# Patient Record
Sex: MALE
[De-identification: names, ages, dates, MRNs, and addresses within clinical notes are randomized per-mention and may not be internally consistent; named-entity substitution may affect disease eponyms.]

---

## 2024-08-22 PROBLEM — Z00.00 ENCOUNTER FOR PREVENTIVE HEALTH EXAMINATION: Status: ACTIVE | Noted: 2024-08-22

## 2024-08-22 PROBLEM — J18.1 CONSOLIDATION OF RIGHT LOWER LOBE OF LUNG: Status: ACTIVE | Noted: 2024-08-22

## 2024-08-26 PROBLEM — Z87.891 FORMER SMOKER: Status: ACTIVE | Noted: 2024-08-26

## 2024-08-26 PROBLEM — Z86.39 HISTORY OF HYPERLIPIDEMIA: Status: RESOLVED | Noted: 2024-08-26 | Resolved: 2024-08-26

## 2024-08-26 PROBLEM — Z80.3 FAMILY HISTORY OF MALIGNANT NEOPLASM OF BREAST: Status: ACTIVE | Noted: 2024-08-26

## 2024-08-26 PROBLEM — Z87.01 HISTORY OF PNEUMONIA: Status: RESOLVED | Noted: 2024-08-26 | Resolved: 2024-08-26

## 2024-08-26 PROBLEM — Z82.5 FAMILY HISTORY OF ASTHMA: Status: ACTIVE | Noted: 2024-08-26

## 2024-08-26 PROBLEM — Z87.09 HISTORY OF CHRONIC OBSTRUCTIVE LUNG DISEASE: Status: RESOLVED | Noted: 2024-08-26 | Resolved: 2024-08-26

## 2024-08-26 PROBLEM — Z86.79 HISTORY OF HYPERTENSION: Status: RESOLVED | Noted: 2024-08-26 | Resolved: 2024-08-26

## 2024-08-28 ENCOUNTER — APPOINTMENT (OUTPATIENT)
Dept: THORACIC SURGERY | Facility: CLINIC | Age: 79
End: 2024-08-28
Payer: MEDICARE

## 2024-08-28 DIAGNOSIS — Z80.3 FAMILY HISTORY OF MALIGNANT NEOPLASM OF BREAST: ICD-10-CM

## 2024-08-28 DIAGNOSIS — Z86.39 PERSONAL HISTORY OF OTHER ENDOCRINE, NUTRITIONAL AND METABOLIC DISEASE: ICD-10-CM

## 2024-08-28 DIAGNOSIS — Z87.891 PERSONAL HISTORY OF NICOTINE DEPENDENCE: ICD-10-CM

## 2024-08-28 DIAGNOSIS — Z86.79 PERSONAL HISTORY OF OTHER DISEASES OF THE CIRCULATORY SYSTEM: ICD-10-CM

## 2024-08-28 DIAGNOSIS — Q33.2 SEQUESTRATION OF LUNG: ICD-10-CM

## 2024-08-28 DIAGNOSIS — J18.1 LOBAR PNEUMONIA, UNSPECIFIED ORGANISM: ICD-10-CM

## 2024-08-28 DIAGNOSIS — Z87.01 PERSONAL HISTORY OF PNEUMONIA (RECURRENT): ICD-10-CM

## 2024-08-28 DIAGNOSIS — Z82.5 FAMILY HISTORY OF ASTHMA AND OTHER CHRONIC LOWER RESPIRATORY DISEASES: ICD-10-CM

## 2024-08-28 DIAGNOSIS — Z87.09 PERSONAL HISTORY OF OTHER DISEASES OF THE RESPIRATORY SYSTEM: ICD-10-CM

## 2024-08-28 PROCEDURE — 99205 OFFICE O/P NEW HI 60 MIN: CPT

## 2024-08-28 RX ORDER — DOFETILIDE 0.5 MG/1
500 CAPSULE ORAL TWICE DAILY
Refills: 0 | Status: ACTIVE | COMMUNITY

## 2024-08-28 RX ORDER — MV-MIN/FOLIC/K1/LYCOPEN/LUTEIN 300-60 MCG
TABLET ORAL
Refills: 0 | Status: ACTIVE | COMMUNITY

## 2024-08-28 RX ORDER — FLUTICASONE PROPIONATE 50 UG/1
50 SPRAY, METERED NASAL TWICE DAILY
Refills: 0 | Status: ACTIVE | COMMUNITY

## 2024-08-28 RX ORDER — OMEPRAZOLE 20 MG/1
20 TABLET, DELAYED RELEASE ORAL
Refills: 0 | Status: ACTIVE | COMMUNITY

## 2024-08-28 RX ORDER — ATORVASTATIN CALCIUM 10 MG/1
10 TABLET, FILM COATED ORAL DAILY
Refills: 0 | Status: ACTIVE | COMMUNITY

## 2024-08-28 RX ORDER — IPRATROPIUM BROMIDE 21 UG/1
0.03 SPRAY NASAL TWICE DAILY
Refills: 0 | Status: ACTIVE | COMMUNITY

## 2024-08-28 RX ORDER — AZELASTINE HYDROCHLORIDE 137 UG/1
0.1 SPRAY, METERED NASAL TWICE DAILY
Refills: 0 | Status: ACTIVE | COMMUNITY

## 2024-08-28 RX ORDER — APIXABAN 5 MG/1
5 TABLET, FILM COATED ORAL
Refills: 0 | Status: ACTIVE | COMMUNITY

## 2024-08-28 RX ORDER — FLUTICASONE FUROATE, UMECLIDINIUM BROMIDE AND VILANTEROL TRIFENATATE 200; 62.5; 25 UG/1; UG/1; UG/1
200-62.5-25 POWDER RESPIRATORY (INHALATION)
Refills: 0 | Status: ACTIVE | COMMUNITY

## 2024-08-28 RX ORDER — MONTELUKAST 10 MG/1
10 TABLET, FILM COATED ORAL DAILY
Refills: 0 | Status: ACTIVE | COMMUNITY

## 2024-08-28 RX ORDER — ALBUTEROL SULFATE 90 UG/1
108 (90 BASE) INHALANT RESPIRATORY (INHALATION)
Refills: 0 | Status: ACTIVE | COMMUNITY

## 2024-08-28 RX ORDER — TAMSULOSIN HYDROCHLORIDE 0.4 MG/1
0.4 CAPSULE ORAL
Refills: 0 | Status: ACTIVE | COMMUNITY

## 2024-08-28 RX ORDER — DIGOXIN 125 UG/1
125 TABLET ORAL DAILY
Refills: 0 | Status: ACTIVE | COMMUNITY

## 2024-08-28 RX ORDER — CETIRIZINE HCL 10 MG
10 TABLET ORAL
Refills: 0 | Status: ACTIVE | COMMUNITY

## 2024-08-28 NOTE — CONSULT LETTER
[Dear  ___] : Dear  [unfilled], [Consult Letter:] : I had the pleasure of evaluating your patient, [unfilled]. [( Thank you for referring [unfilled] for consultation for _____ )] : Thank you for referring [unfilled] for consultation for [unfilled] [Please see my note below.] : Please see my note below. [Consult Closing:] : Thank you very much for allowing me to participate in the care of this patient.  If you have any questions, please do not hesitate to contact me. [Sincerely,] : Sincerely, [FreeTextEntry2] : Dr. Rashaun Kim (pulm/ref) [FreeTextEntry3] : Toño Sanchez MD, FACS , Division of Thoracic Surgery [facility] Thoracic Surgery [facility] Department of Cardiovascular & Thoracic Surgery  [employer] at [facility]

## 2024-08-28 NOTE — HISTORY OF PRESENT ILLNESS
[FreeTextEntry1] : Mr. BENJAMIN POOLE, 79 year old male, former smoker (30 pack yrs, quit 1996), w/ hx of COPD, AFib (on Eliquis, Tikosyn, and Diltiazem), BPH, GERD, HLD, HTN, bronchiectasis, and recurrence pneumonia x 1 month with suspicion of pulmonary sequestration.   CXR on 04/16/2020 ([facility]): wheeze, cough - There is a moderate right lower lobe pneumonia. A mild pneumonia is noted in the left lung base.  CXR on 06/12/2024 ([facility]): Subacute cough, wheezing, and SOB - Recurrent moderate posterior basal segment right lower lobe pneumonia.   CT Chest on 07/23/2024 ([facility]): - Moderate emphysema.  - There is a complex collection in the RLL, containing soft tissue density material mixed with air, fluid, and calcifications, the dominant component of which measures 8.9 x 5.9 x 5.5 cm. The calcifications and presence on chest x-ray since at least 4/16/2020, indicate chronicity.  - There are pleural-based calcifications in the RLL adjacent to the chronic consolidation. - 2.9 cm hepatic hypodensity/cyst.  CTA Chest on w/IV Contrast on 08/05/2024 ([facility]): - Secretions are present in the right mainstem bronchus, bronchus intermedius, and proximal RLL bronchi. - Stable emphysema. - Again seen is a chronic RLL consolidation containing air and fluid pockets as well as coarse calcifications, consistent with a sequestration. - There are calcifications along the adjacent pleura in the RLL. - The main pulmonary artery is dilated to 4.1 cm, suggesting pulmonary hypertension.   Patient is here today for CT Sx consultation, referred by Dr. Rashaun Kim (pulm) for possible surgical resection of RLL consolidation. Reports chronic productive cough with intermittent shortness of breath.

## 2024-08-28 NOTE — CONSULT LETTER
[Dear  ___] : Dear  [unfilled], [Consult Letter:] : I had the pleasure of evaluating your patient, [unfilled]. [( Thank you for referring [unfilled] for consultation for _____ )] : Thank you for referring [unfilled] for consultation for [unfilled] [Please see my note below.] : Please see my note below. [Consult Closing:] : Thank you very much for allowing me to participate in the care of this patient.  If you have any questions, please do not hesitate to contact me. [Sincerely,] : Sincerely, [FreeTextEntry2] : Dr. Rashaun Kim (pulm/ref) [FreeTextEntry3] : Toño Sanchez MD, FACS , Division of Thoracic Surgery [facility] Thoracic Surgery [facility] Department of Cardiovascular & Thoracic Surgery  [employer] at [facility]

## 2024-08-28 NOTE — HISTORY OF PRESENT ILLNESS
[FreeTextEntry1] : Mr. BENJAMIN POOLE, 79 year old male, former smoker (30 pack yrs, quit 1996), w/ hx of COPD, AFib (on Eliquis, Tikosyn, and Diltiazem), BPH, GERD, HLD, HTN, bronchiectasis, and recurrence pneumonia x 1 month with suspicion of pulmonary sequestration.   CXR on 04/16/2020 ([facility]): wheeze, cough - There is a moderate right lower lobe pneumonia. A mild pneumonia is noted in the left lung base.  CXR on 06/12/2024 ([facility]): Subacute cough, wheezing, and SOB - Recurrent moderate posterior basal segment right lower lobe pneumonia.   CT Chest on 07/23/2024 ([facility]): - Moderate emphysema.  - There is a complex collection in the RLL, containing soft tissue density material mixed with air, fluid, and calcifications, the dominant component of which measures 8.9 x 5.9 x 5.5 cm. The calcifications and presence on chest x-ray since at least 4/16/2020, indicate chronicity.  - There are pleural-based calcifications in the RLL adjacent to the chronic consolidation. - 2.9 cm hepatic hypodensity/cyst.  CTA Chest on w/IV Contrast on 08/05/2024 ([facility]): - Secretions are present in the right mainstem bronchus, bronchus intermedius, and proximal RLL bronchi. - Stable emphysema. - Again seen is a chronic RLL consolidation containing air and fluid pockets as well as coarse calcifications, consistent with a sequestration. - There are calcifications along the adjacent pleura in the RLL. - The main pulmonary artery is dilated to 4.1 cm, suggesting pulmonary hypertension.   Patient is here today for CT Sx consultation, referred by Dr. Rashaun Kim (pulm) for possible surgical resection of RLL consolidation. Reports chronic productive cough with intermittent shortness of breath.

## 2024-08-28 NOTE — HISTORY OF PRESENT ILLNESS
[FreeTextEntry1] : Mr. BENJAMIN POOLE, 79 year old male, former smoker (30 pack yrs, quit 1996), w/ hx of COPD, AFib (on Eliquis, Tikosyn, and Diltiazem), BPH, GERD, HLD, HTN, bronchiectasis, and recurrence pneumonia x 1 month with suspicion of pulmonary sequestration.   CXR on 04/16/2020 ([facility]): wheeze, cough - There is a moderate right lower lobe pneumonia. A mild pneumonia is noted in the left lung base.  CXR on 06/12/2024 ([facility]): Subacute cough, wheezing, and SOB - Recurrent moderate posterior basal segment right lower lobe pneumonia.   CT Chest on 07/23/2024 ([facility]): - Moderate emphysema.  - There is a complex collection in the RLL, containing soft tissue density material mixed with air, fluid, and calcifications, the dominant component of which measures 8.9 x 5.9 x 5.5 cm. The calcifications and presence on chest x-ray since at least 4/16/2020, indicate chronicity.  - There are pleural-based calcifications in the RLL adjacent to the chronic consolidation. - 2.9 cm hepatic hypodensity/cyst.  CTA Chest on w/IV Contrast on 08/05/2024 ([facility]): - Secretions are present in the right mainstem bronchus, bronchus intermedius, and proximal RLL bronchi. - Stable emphysema. - Again seen is a chronic RLL consolidation containing air and fluid pockets as well as coarse calcifications, consistent with a sequestration. - There are calcifications along the adjacent pleura in the RLL. - The main pulmonary artery is dilated to 4.1 cm, suggesting pulmonary hypertension.   Patient is here today for CT Sx consultation, referred by Dr. Rashaun Kim (pulm) for possible surgical resection of RLL consolidation. Reports chronic productive cough with intermittent shortness of breath.

## 2024-08-28 NOTE — HISTORY OF PRESENT ILLNESS
[FreeTextEntry1] : Mr. BENJAMIN POOLE, 79 year old male, former smoker (30 pack yrs, quit 1996), w/ hx of COPD, AFib (on Eliquis, Tikosyn, and Diltiazem), BPH, GERD, HLD, HTN, bronchiectasis, and recurrence pneumonia x 1 month with suspicion of pulmonary sequestration.   CXR on 04/16/2020 ([facility]): wheeze, cough - There is a moderate right lower lobe pneumonia. A mild pneumonia is noted in the left lung base.  CXR on 06/12/2024 ([facility]): Subacute cough, wheezing, and SOB - Recurrent moderate posterior basal segment right lower lobe pneumonia.   CT Chest on 07/23/2024 ([facility]): - Moderate emphysema.  - There is a complex collection in the RLL, containing soft tissue density material mixed with air, fluid, and calcifications, the dominant component of which measures 8.9 x 5.9 x 5.5 cm. The calcifications and presence on chest x-ray since at least 4/16/2020, indicate chronicity.  - There are pleural-based calcifications in the RLL adjacent to the chronic consolidation. - 2.9 cm hepatic hypodensity/cyst.  CTA Chest on w/IV Contrast on 08/05/2024 ([facility]): - Secretions are present in the right mainstem bronchus, bronchus intermedius, and proximal RLL bronchi. - Stable emphysema. - Again seen is a chronic RLL consolidation containing air and fluid pockets as well as coarse calcifications, consistent with a sequestration. - There are calcifications along the adjacent pleura in the RLL. - The main pulmonary artery is dilated to 4.1 cm, suggesting pulmonary hypertension.   Patient is here today for CT Sx consultation, referred by Dr. Rashaun Kim (pulm) for possible surgical resection of RLL consolidation. Reports chronic productive cough with intermittent shortness of breath.

## 2024-08-28 NOTE — PHYSICAL EXAM
[Fully active, able to carry on all pre-disease performance without restriction] : Status 0 - Fully active, able to carry on all pre-disease performance without restriction [General Appearance - Alert] : alert [General Appearance - In No Acute Distress] : in no acute distress [Sclera] : the sclera and conjunctiva were normal [PERRL With Normal Accommodation] : pupils were equal in size, round, and reactive to light [Extraocular Movements] : extraocular movements were intact [Outer Ear] : the ears and nose were normal in appearance [Oropharynx] : the oropharynx was normal [Neck Appearance] : the appearance of the neck was normal [Neck Cervical Mass (___cm)] : no neck mass was observed [Thyroid Diffuse Enlargement] : the thyroid was not enlarged [Jugular Venous Distention Increased] : there was no jugular-venous distention [Thyroid Nodule] : there were no palpable thyroid nodules [Auscultation Breath Sounds / Voice Sounds] : lungs were clear to auscultation bilaterally [Heart Rate And Rhythm] : heart rate was normal and rhythm regular [Heart Sounds] : normal S1 and S2 [Heart Sounds Gallop] : no gallops [Murmurs] : no murmurs [Heart Sounds Pericardial Friction Rub] : no pericardial rub [Examination Of The Chest] : the chest was normal in appearance [Chest Visual Inspection Thoracic Asymmetry] : no chest asymmetry [Diminished Respiratory Excursion] : normal chest expansion [2+] : left 2+ [No Abnormalities] : the abdominal aorta was not enlarged and no bruit was heard [Breast Appearance] : normal in appearance [Breast Palpation Mass] : no palpable masses [Bowel Sounds] : normal bowel sounds [Abdomen Soft] : soft [Abdomen Tenderness] : non-tender [Abdomen Mass (___ Cm)] : no abdominal mass palpated [Cervical Lymph Nodes Enlarged Posterior Bilaterally] : posterior cervical [Cervical Lymph Nodes Enlarged Anterior Bilaterally] : anterior cervical [Supraclavicular Lymph Nodes Enlarged Bilaterally] : supraclavicular [Axillary Lymph Nodes Enlarged Bilaterally] : axillary [Femoral Lymph Nodes Enlarged Bilaterally] : femoral [Inguinal Lymph Nodes Enlarged Bilaterally] : inguinal [No CVA Tenderness] : no ~M costovertebral angle tenderness [No Spinal Tenderness] : no spinal tenderness [Abnormal Walk] : normal gait [Nail Clubbing] : no clubbing  or cyanosis of the fingernails [Musculoskeletal - Swelling] : no joint swelling seen [Motor Tone] : muscle strength and tone were normal [Skin Color & Pigmentation] : normal skin color and pigmentation [Skin Turgor] : normal skin turgor [] : no rash [Deep Tendon Reflexes (DTR)] : deep tendon reflexes were 2+ and symmetric [Sensation] : the sensory exam was normal to light touch and pinprick [No Focal Deficits] : no focal deficits [Oriented To Time, Place, And Person] : oriented to person, place, and time [Impaired Insight] : insight and judgment were intact [Affect] : the affect was normal [Right Carotid Bruit] : no bruit heard over the right carotid [Left Carotid Bruit] : no bruit heard over the left carotid [Right Femoral Bruit] : no bruit heard over the right femoral artery [Left Femoral Bruit] : no bruit heard over the left femoral artery [FreeTextEntry1] : deferred

## 2024-08-28 NOTE — ASSESSMENT
[FreeTextEntry1] : Mr. BENJAMIN POOLE, 79 year old male, former smoker (30 pack yrs, quit 1996), w/ hx of COPD, AFib (on Eliquis, Tikosyn, and Diltiazem), BPH, GERD, HLD, HTN, bronchiectasis, and recurrence pneumonia x 1 month with suspicion of pulmonary sequestration.  Patient is here today for CT Sx consultation, referred by Dr. Rashaun Kim (pulm).  I have independently reviewed the medical records and imaging at the time of this office consultation. Imaging reviewed with patient, findings consistent with pulmonary sequestration and RLL consolidation from recurrence pneumonia over the years. Surgical approach reviewed with patient. Discussed risks in details, patient is agreeable to proceed. Will schedule for Right thoracotomy, RLLobectomy with epidural and cryoablation of intercoastal space. He will need cardiac clearance with ECHO. Will need to hold Eliquis for 3 days prior to surgery. Will obtain PFTs from Dr. Kim.   Recommendations reviewed with patient during this office visit, and all questions answered; Patient instructed on the importance of follow up and verbalizes understanding.    I, DANIELA Swain, personally performed the evaluation and management (E/M) services for this new patient.  That E/M includes conducting the initial examination, assessing all conditions, and establishing the plan of care.  Today, my ACP, Jason Hand NP, was here to observe my evaluation and management services for this patient to be followed going forward.

## 2024-08-28 NOTE — CONSULT LETTER
[Dear  ___] : Dear  [unfilled], [Consult Letter:] : I had the pleasure of evaluating your patient, [unfilled]. [( Thank you for referring [unfilled] for consultation for _____ )] : Thank you for referring [unfilled] for consultation for [unfilled] [Please see my note below.] : Please see my note below. [Consult Closing:] : Thank you very much for allowing me to participate in the care of this patient.  If you have any questions, please do not hesitate to contact me. [Sincerely,] : Sincerely, [FreeTextEntry2] : Dr. Rashaun Kim (pulm/ref) [FreeTextEntry3] : Toño Sanchez MD, FACS , Division of Thoracic Surgery [facility] Thoracic Surgery [facility] Department of Cardiovascular & Thoracic Surgery  [employer] at [facility]

## 2024-08-28 NOTE — DATA REVIEWED
WDL
[FreeTextEntry1] : I have independently reviewed the following: CXR on 04/16/2020 CXR on 06/12/2024 CT Chest on 07/23/2024 CTA Chest on w/IV Contrast on 08/05/2024 

## 2024-08-28 NOTE — CONSULT LETTER
[Dear  ___] : Dear  [unfilled], [Consult Letter:] : I had the pleasure of evaluating your patient, [unfilled]. [( Thank you for referring [unfilled] for consultation for _____ )] : Thank you for referring [unfilled] for consultation for [unfilled] [Please see my note below.] : Please see my note below. [Consult Closing:] : Thank you very much for allowing me to participate in the care of this patient.  If you have any questions, please do not hesitate to contact me. [Sincerely,] : Sincerely, [FreeTextEntry2] : Dr. Rashaun Kim (pulm/ref) [FreeTextEntry3] : Toño Sanchez MD, FACS , Division of Thoracic Surgery [facility] Thoracic Surgery [facility] Department of Cardiovascular & Thoracic Surgery  [employer] at [facility]

## 2024-10-15 ENCOUNTER — OUTPATIENT (OUTPATIENT)
Dept: OUTPATIENT SERVICES | Facility: HOSPITAL | Age: 79
LOS: 1 days | End: 2024-10-15

## 2024-10-15 VITALS
WEIGHT: 179.9 LBS | HEART RATE: 64 BPM | SYSTOLIC BLOOD PRESSURE: 145 MMHG | HEIGHT: 70 IN | OXYGEN SATURATION: 97 % | TEMPERATURE: 98 F | DIASTOLIC BLOOD PRESSURE: 62 MMHG | RESPIRATION RATE: 17 BRPM

## 2024-10-15 DIAGNOSIS — Q33.2 SEQUESTRATION OF LUNG: ICD-10-CM

## 2024-10-15 DIAGNOSIS — Z98.49 CATARACT EXTRACTION STATUS, UNSPECIFIED EYE: Chronic | ICD-10-CM

## 2024-10-15 DIAGNOSIS — I10 ESSENTIAL (PRIMARY) HYPERTENSION: ICD-10-CM

## 2024-10-15 DIAGNOSIS — Z90.89 ACQUIRED ABSENCE OF OTHER ORGANS: Chronic | ICD-10-CM

## 2024-10-15 DIAGNOSIS — I48.91 UNSPECIFIED ATRIAL FIBRILLATION: ICD-10-CM

## 2024-10-15 LAB
ANION GAP SERPL CALC-SCNC: 11 MMOL/L — SIGNIFICANT CHANGE UP (ref 7–14)
BLD GP AB SCN SERPL QL: NEGATIVE — SIGNIFICANT CHANGE UP
BUN SERPL-MCNC: 14 MG/DL — SIGNIFICANT CHANGE UP (ref 7–23)
CALCIUM SERPL-MCNC: 9.5 MG/DL — SIGNIFICANT CHANGE UP (ref 8.4–10.5)
CHLORIDE SERPL-SCNC: 101 MMOL/L — SIGNIFICANT CHANGE UP (ref 98–107)
CO2 SERPL-SCNC: 26 MMOL/L — SIGNIFICANT CHANGE UP (ref 22–31)
CREAT SERPL-MCNC: 0.86 MG/DL — SIGNIFICANT CHANGE UP (ref 0.5–1.3)
EGFR: 88 ML/MIN/1.73M2 — SIGNIFICANT CHANGE UP
GLUCOSE SERPL-MCNC: 91 MG/DL — SIGNIFICANT CHANGE UP (ref 70–99)
POTASSIUM SERPL-MCNC: 4 MMOL/L — SIGNIFICANT CHANGE UP (ref 3.5–5.3)
POTASSIUM SERPL-SCNC: 4 MMOL/L — SIGNIFICANT CHANGE UP (ref 3.5–5.3)
RH IG SCN BLD-IMP: POSITIVE — SIGNIFICANT CHANGE UP
RH IG SCN BLD-IMP: POSITIVE — SIGNIFICANT CHANGE UP
SODIUM SERPL-SCNC: 138 MMOL/L — SIGNIFICANT CHANGE UP (ref 135–145)

## 2024-10-15 NOTE — H&P PST ADULT - PROBLEM SELECTOR PLAN 3
Postoperative Delirium Screen    Patient eligible for azael risk screen age>75?  (if <= 75 then done) Yes    Health care proxy paperwork given to patient? pt. does not want HCP form    Impaired mobility (ie: uses cane, walker, wheelchair, or assist device)? No    Known dementia diagnosis? No    Impaired functional status (METS<4)? No    Malnutrition BMI<20? No

## 2024-10-15 NOTE — H&P PST ADULT - RESPIRATORY AND THORAX COMMENTS
[facility] x2-last ER visit  was because of pneumonia 8/2024, last hospitalization was 3/2021 for asthma and pneumonia, last rescue inhaler use "about 9 months ago" [facility] x2-last ER visit  was because of pneumonia 8/2024, last hospitalization was 3/2021 for asthma and pneumonia, last rescue inhaler use "about 9 months ago", last wheezing "was when I had pneumonia in August" (almost 2 months ago) pt. has emphysema, Was at [facility] x2-last ER visit  was because of pneumonia 8/2024, last hospitalization was 3/2021 for asthma and pneumonia, last rescue inhaler use "about 9 months ago", last wheezing "was when I had pneumonia in August" (almost 2 months ago)

## 2024-10-15 NOTE — H&P PST ADULT - PROBLEM SELECTOR PLAN 2
Established patient with new problem    Ariane is here today for complaints of left knee pain.  She had a fall while she was transferring from her chair with assistance from an aide.  She does not recall if she fell directly onto the knee or onto her side.  This occurred about 2 weeks ago.  She is nonambulatory, transferring between bed to chair with assistance.  The pain since the fall has been within her left knee.  It is painful with movement.  Treatment thus far has included Tylenol.  Prior history of a left femur fracture with intramedullary rodding April 29, 2020 by Dr. Carlos Alberto Priest.    PHYSICAL EXAMINATION:  In general, She is alert and oriented and in no acute distress. Ariane is well-groomed and cooperative with the exam. Affect and mood are appropriate. Skin is intact. No lesions noted.     Physical examination of the left knee reveals a mild-to-moderate effusion.  Tenderness to palpation to the medial and lateral joint line.  Range of motion is tolerated well with minimal complaints of pain from °.  Calf is soft and nontender.  Sensory and motor exams are intact. Distal pulses are palpable.    IMAGING:  X-rays taken today of the left knee show chondrocalcinosis of the medial lateral compartments.  Generalized arthritic changes throughout the knee.  Generalized osteopenia.  No evidence of fracture.  Interval healing of the distal femur fracture with noted intramedullary rodding.    IMPRESSION:  Left knee osteoarthritis, chondrocalcinosis    PLAN:  A cortisone injection is recommended to the left knee today.  She is agreeable to a cortisone injection today.  Continue with activities to tolerance, relies on wheelchair for mobility.   Follow-up as needed.      Under sterile chlorhexidine prep,  2 cc of 2% lidocaine and 40 mg of triamcinolone was injected within the left knee and tolerated well.       
Pt.. had cardiac evaluation.  Pt. states the Cardiologist did not give instructions on if/when to stop Eliquis.  Pt. instructed to take last dose of Eliquis 10/18/24 as per [facility] protocol.  Pt. instructed to confirm with his Cardiologist.

## 2024-10-15 NOTE — H&P PST ADULT - BSA (M2)
Refilled Omeprazole #90.   Discussion of long-term effects of chronic PPI use should be done with patient before continuing this further.   2

## 2024-10-15 NOTE — H&P PST ADULT - PROBLEM SELECTOR PLAN 1
Pt. is scheduled for a right thoracotomy, right lower lobe lobectomy with epidural and cryoablation of intercostal space 10/21/24.  Pt. verbalized understanding of instructions and that Chlorhexidine is for external use. PST CBC, BMP, T&S, and conf. T&S drawn.

## 2024-10-15 NOTE — H&P PST ADULT - RS GEN HX ROS MEA POS PC
"white gray", has had the cough "off and on for 40 years because it's tied to the bronchiectasis I have in the right lung"/cough/sputum production "white gray", has had the cough "off and on for 40 years because it's tied to the bronchiectasis I have in the right lung"/dyspnea/cough/sputum production

## 2024-10-15 NOTE — H&P PST ADULT - CARDIOVASCULAR COMMENTS
no YOUSIF while at [facility], pt. is on [employer] for agusto mayo has a. fib.

## 2024-10-15 NOTE — H&P PST ADULT - NSICDXFAMILYHX_GEN_ALL_CORE_FT
FAMILY HISTORY:  Father  Still living? No  FH: heart failure, Age at diagnosis: Age Unknown    Mother  Still living? No  FH: breast cancer, Age at diagnosis: Age Unknown    Aunt  Still living? No  FH: breast cancer, Age at diagnosis: Age Unknown

## 2024-10-15 NOTE — H&P PST ADULT - REASON FOR ADMISSION
"they're taking a piece ot the lung out, a lobectomy, it's a pulmonary sequestration, it's very rare, so it figures"

## 2024-10-15 NOTE — H&P PST ADULT - NSICDXPASTSURGICALHX_GEN_ALL_CORE_FT
PAST SURGICAL HISTORY:  H/O cataract extraction     History of appendectomy     S/P T&A (status post tonsillectomy and adenoidectomy)

## 2024-10-15 NOTE — H&P PST ADULT - NSICDXPASTMEDICALHX_GEN_ALL_CORE_FT
PAST MEDICAL HISTORY:  Atrial fibrillation     Bronchiectasis     COPD (chronic obstructive pulmonary disease)     Enlarged prostate     GERD (gastroesophageal reflux disease)     HTN (hypertension)     Left cataract     Recurrent pneumonia     Seasonal allergies      PAST MEDICAL HISTORY:  Atrial fibrillation     Bronchiectasis     COPD (chronic obstructive pulmonary disease)     Enlarged prostate     GERD (gastroesophageal reflux disease)     HTN (hypertension)     Left cataract     Pulmonary sequestration     Recurrent pneumonia     Seasonal allergies      PAST MEDICAL HISTORY:  Asthma     Atrial fibrillation     Bronchiectasis     Chronic cough     Emphysema/COPD     Enlarged prostate     GERD (gastroesophageal reflux disease)     HTN (hypertension)     Left cataract     Pulmonary sequestration     Recurrent pneumonia     Seasonal allergies      PAST MEDICAL HISTORY:  Asthma     Atrial fibrillation     Bronchiectasis     Chronic cough     Emphysema/COPD     Enlarged prostate     GERD (gastroesophageal reflux disease)     Hearing loss     HTN (hypertension)     Left cataract     Pulmonary sequestration     Recurrent pneumonia     Seasonal allergies

## 2024-10-15 NOTE — H&P PST ADULT - OTHER CARE PROVIDERS
Dr. Kim, Pulmonologist Dr. Kim, Pulmonologist [phone]; Dr. Christopher, Cardiologist [phone]; Dr. Zamora, Allergist [phone]

## 2024-10-15 NOTE — H&P PST ADULT - HISTORY OF PRESENT ILLNESS
Pt. is a 78 yo male with recurrent pneumonia.  Pt. was evaluated by his new Pulmonologist.  The findings were indicative of needing a CT scan.  The results were abnormal.   Pt. is a 80 yo male with recurrent pneumonia.  Last hospitalization was 8/2024.  Pt. was evaluated by his new Pulmonologist.  The findings were indicative of needing a CT scan.  The results showed lung sequestration.     Pt. is a 80 yo male with recurrent pneumonia.  Last hospitalization was 8/2024.  Pt. was evaluated by his new Pulmonologist.  The findings were indicative of needing a CT scan.  The results showed pulmonary sequestration.

## 2024-10-15 NOTE — H&P PST ADULT - RHONCHI
diffuse, had pt. cough, after cough it was less diffuse, asked pt. to cough, after cough it was less

## 2024-10-16 LAB
BASOPHILS # BLD AUTO: 0.04 K/UL — SIGNIFICANT CHANGE UP (ref 0–0.2)
BASOPHILS NFR BLD AUTO: 0.4 % — SIGNIFICANT CHANGE UP (ref 0–2)
EOSINOPHIL # BLD AUTO: 0.21 K/UL — SIGNIFICANT CHANGE UP (ref 0–0.5)
EOSINOPHIL NFR BLD AUTO: 1.9 % — SIGNIFICANT CHANGE UP (ref 0–6)
HCT VFR BLD CALC: 44.2 % — SIGNIFICANT CHANGE UP (ref 39–50)
HGB BLD-MCNC: 14.8 G/DL — SIGNIFICANT CHANGE UP (ref 13–17)
IMM GRANULOCYTES NFR BLD AUTO: 0.4 % — SIGNIFICANT CHANGE UP (ref 0–0.9)
LYMPHOCYTES # BLD AUTO: 18.5 % — SIGNIFICANT CHANGE UP (ref 13–44)
LYMPHOCYTES # BLD AUTO: 2.04 K/UL — SIGNIFICANT CHANGE UP (ref 1–3.3)
MCHC RBC-ENTMCNC: 31.2 PG — SIGNIFICANT CHANGE UP (ref 27–34)
MCHC RBC-ENTMCNC: 33.5 GM/DL — SIGNIFICANT CHANGE UP (ref 32–36)
MCV RBC AUTO: 93.2 FL — SIGNIFICANT CHANGE UP (ref 80–100)
MONOCYTES # BLD AUTO: 1.03 K/UL — HIGH (ref 0–0.9)
MONOCYTES NFR BLD AUTO: 9.3 % — SIGNIFICANT CHANGE UP (ref 2–14)
NEUTROPHILS # BLD AUTO: 7.66 K/UL — HIGH (ref 1.8–7.4)
NEUTROPHILS NFR BLD AUTO: 69.5 % — SIGNIFICANT CHANGE UP (ref 43–77)
PLATELET # BLD AUTO: 434 K/UL — HIGH (ref 150–400)
RBC # BLD: 4.74 M/UL — SIGNIFICANT CHANGE UP (ref 4.2–5.8)
RBC # FLD: 13.1 % — SIGNIFICANT CHANGE UP (ref 10.3–14.5)
WBC # BLD: 11.02 K/UL — HIGH (ref 3.8–10.5)
WBC # FLD AUTO: 11.02 K/UL — HIGH (ref 3.8–10.5)

## 2024-10-18 RX ORDER — ACETAMINOPHEN 500 MG
975 TABLET ORAL ONCE
Refills: 0 | Status: COMPLETED | OUTPATIENT
Start: 2024-10-21 | End: 2024-10-21

## 2024-10-18 NOTE — ASU PATIENT PROFILE, ADULT - ABILITY TO HEAR (WITH HEARING AID OR HEARING APPLIANCE IF NORMALLY USED):
Resighini/Mildly to Moderately Impaired: difficulty hearing in some environments or speaker may need to increase volume or speak distinctly

## 2024-10-18 NOTE — ASU PATIENT PROFILE, ADULT - NSICDXPASTMEDICALHX_GEN_ALL_CORE_FT
PAST MEDICAL HISTORY:  Asthma     Atrial fibrillation     Bronchiectasis     Chronic cough     Emphysema/COPD     Enlarged prostate     GERD (gastroesophageal reflux disease)     Hearing loss     HTN (hypertension)     Left cataract     Pulmonary sequestration     Recurrent pneumonia     Seasonal allergies

## 2024-10-21 ENCOUNTER — APPOINTMENT (OUTPATIENT)
Dept: THORACIC SURGERY | Facility: HOSPITAL | Age: 79
End: 2024-10-21
Payer: MEDICARE

## 2024-10-21 ENCOUNTER — INPATIENT (INPATIENT)
Facility: HOSPITAL | Age: 79
LOS: 2 days | Discharge: HOME | End: 2024-10-24
Attending: THORACIC SURGERY (CARDIOTHORACIC VASCULAR SURGERY) | Admitting: THORACIC SURGERY (CARDIOTHORACIC VASCULAR SURGERY)
Payer: MEDICARE

## 2024-10-21 ENCOUNTER — RESULT REVIEW (OUTPATIENT)
Age: 79
End: 2024-10-21

## 2024-10-21 ENCOUNTER — TRANSCRIPTION ENCOUNTER (OUTPATIENT)
Age: 79
End: 2024-10-21

## 2024-10-21 VITALS
SYSTOLIC BLOOD PRESSURE: 162 MMHG | DIASTOLIC BLOOD PRESSURE: 54 MMHG | OXYGEN SATURATION: 95 % | RESPIRATION RATE: 16 BRPM | TEMPERATURE: 98 F | HEART RATE: 78 BPM | WEIGHT: 179.9 LBS | HEIGHT: 70 IN

## 2024-10-21 DIAGNOSIS — Q33.2 SEQUESTRATION OF LUNG: ICD-10-CM

## 2024-10-21 DIAGNOSIS — Z90.89 ACQUIRED ABSENCE OF OTHER ORGANS: Chronic | ICD-10-CM

## 2024-10-21 DIAGNOSIS — Z98.49 CATARACT EXTRACTION STATUS, UNSPECIFIED EYE: Chronic | ICD-10-CM

## 2024-10-21 LAB
ANION GAP SERPL CALC-SCNC: 11 MMOL/L — SIGNIFICANT CHANGE UP (ref 7–14)
BUN SERPL-MCNC: 15 MG/DL — SIGNIFICANT CHANGE UP (ref 7–23)
CALCIUM SERPL-MCNC: 8.2 MG/DL — LOW (ref 8.4–10.5)
CHLORIDE SERPL-SCNC: 104 MMOL/L — SIGNIFICANT CHANGE UP (ref 98–107)
CO2 SERPL-SCNC: 22 MMOL/L — SIGNIFICANT CHANGE UP (ref 22–31)
CREAT SERPL-MCNC: 0.77 MG/DL — SIGNIFICANT CHANGE UP (ref 0.5–1.3)
EGFR: 91 ML/MIN/1.73M2 — SIGNIFICANT CHANGE UP
GLUCOSE SERPL-MCNC: 130 MG/DL — HIGH (ref 70–99)
HCT VFR BLD CALC: 40.5 % — SIGNIFICANT CHANGE UP (ref 39–50)
HGB BLD-MCNC: 13.7 G/DL — SIGNIFICANT CHANGE UP (ref 13–17)
MAGNESIUM SERPL-MCNC: 1.9 MG/DL — SIGNIFICANT CHANGE UP (ref 1.6–2.6)
MCHC RBC-ENTMCNC: 31.5 PG — SIGNIFICANT CHANGE UP (ref 27–34)
MCHC RBC-ENTMCNC: 33.8 GM/DL — SIGNIFICANT CHANGE UP (ref 32–36)
MCV RBC AUTO: 93.1 FL — SIGNIFICANT CHANGE UP (ref 80–100)
NRBC # BLD: 0 /100 WBCS — SIGNIFICANT CHANGE UP (ref 0–0)
NRBC # FLD: 0 K/UL — SIGNIFICANT CHANGE UP (ref 0–0)
PHOSPHATE SERPL-MCNC: 3.1 MG/DL — SIGNIFICANT CHANGE UP (ref 2.5–4.5)
PLATELET # BLD AUTO: 373 K/UL — SIGNIFICANT CHANGE UP (ref 150–400)
POTASSIUM SERPL-MCNC: 4.2 MMOL/L — SIGNIFICANT CHANGE UP (ref 3.5–5.3)
POTASSIUM SERPL-SCNC: 4.2 MMOL/L — SIGNIFICANT CHANGE UP (ref 3.5–5.3)
RBC # BLD: 4.35 M/UL — SIGNIFICANT CHANGE UP (ref 4.2–5.8)
RBC # FLD: 12.5 % — SIGNIFICANT CHANGE UP (ref 10.3–14.5)
SODIUM SERPL-SCNC: 137 MMOL/L — SIGNIFICANT CHANGE UP (ref 135–145)
WBC # BLD: 18.53 K/UL — HIGH (ref 3.8–10.5)
WBC # FLD AUTO: 18.53 K/UL — HIGH (ref 3.8–10.5)

## 2024-10-21 PROCEDURE — 88309 TISSUE EXAM BY PATHOLOGIST: CPT | Mod: 26

## 2024-10-21 PROCEDURE — 64620 INJECTION TREATMENT OF NERVE: CPT | Mod: 59

## 2024-10-21 PROCEDURE — 32480 PARTIAL REMOVAL OF LUNG: CPT | Mod: RT

## 2024-10-21 PROCEDURE — ZZZZZ: CPT

## 2024-10-21 PROCEDURE — 88305 TISSUE EXAM BY PATHOLOGIST: CPT | Mod: 26

## 2024-10-21 PROCEDURE — 99233 SBSQ HOSP IP/OBS HIGH 50: CPT

## 2024-10-21 PROCEDURE — 32124 EXPLORE CHEST FREE ADHESIONS: CPT | Mod: 80,RT,59

## 2024-10-21 PROCEDURE — 31646 BRNCHSC W/THER ASPIR SBSQ: CPT | Mod: 59

## 2024-10-21 PROCEDURE — 32124 EXPLORE CHEST FREE ADHESIONS: CPT | Mod: RT,59

## 2024-10-21 PROCEDURE — 31624 DX BRONCHOSCOPE/LAVAGE: CPT | Mod: 59

## 2024-10-21 PROCEDURE — 71045 X-RAY EXAM CHEST 1 VIEW: CPT | Mod: 26

## 2024-10-21 PROCEDURE — 31645 BRNCHSC W/THER ASPIR 1ST: CPT | Mod: 59

## 2024-10-21 PROCEDURE — 32480 PARTIAL REMOVAL OF LUNG: CPT | Mod: 80,RT

## 2024-10-21 DEVICE — STAPLER COVIDIEN TA 60 GREEN: Type: IMPLANTABLE DEVICE | Site: RIGHT | Status: FUNCTIONAL

## 2024-10-21 DEVICE — BONE WAX 2.5GM: Type: IMPLANTABLE DEVICE | Site: RIGHT | Status: FUNCTIONAL

## 2024-10-21 DEVICE — SURGICEL FIBRILLAR 2 X 4": Type: IMPLANTABLE DEVICE | Site: RIGHT | Status: FUNCTIONAL

## 2024-10-21 DEVICE — STAPLER COVIDIEN TA 60 GREEN RELOAD: Type: IMPLANTABLE DEVICE | Site: RIGHT | Status: FUNCTIONAL

## 2024-10-21 DEVICE — STAPLER ETHICON GST ECHELON 45MM GREEN RELOAD: Type: IMPLANTABLE DEVICE | Site: RIGHT | Status: FUNCTIONAL

## 2024-10-21 DEVICE — PROBE CRYOSPHERE CRYOICE PLUS CRYOABLATION 8MMX11IN: Type: IMPLANTABLE DEVICE | Site: RIGHT | Status: FUNCTIONAL

## 2024-10-21 DEVICE — STAPLER ETHICON ECHELON ENDOPATH VASCULAR 35MM WHITE RELOAD: Type: IMPLANTABLE DEVICE | Site: RIGHT | Status: FUNCTIONAL

## 2024-10-21 DEVICE — CHEST DRAIN THORACIC ARGYLE PVC 24FR STRAIGHT: Type: IMPLANTABLE DEVICE | Site: RIGHT | Status: FUNCTIONAL

## 2024-10-21 RX ORDER — CEFAZOLIN SODIUM 1 G
2000 VIAL (EA) INJECTION EVERY 8 HOURS
Refills: 0 | Status: DISCONTINUED | OUTPATIENT
Start: 2024-10-21 | End: 2024-10-22

## 2024-10-21 RX ORDER — CHLORHEXIDINE GLUCONATE 40 MG/ML
1 SOLUTION TOPICAL DAILY
Refills: 0 | Status: DISCONTINUED | OUTPATIENT
Start: 2024-10-21 | End: 2024-10-24

## 2024-10-21 RX ORDER — TIOTROPIUM BROMIDE INHALATION SPRAY 1.56 UG/1
2 SPRAY, METERED RESPIRATORY (INHALATION) DAILY
Refills: 0 | Status: DISCONTINUED | OUTPATIENT
Start: 2024-10-21 | End: 2024-10-24

## 2024-10-21 RX ORDER — HYDROMORPHONE HCL/0.9% NACL/PF 6 MG/30 ML
250 PATIENT CONTROLLED ANALGESIA SYRINGE INTRAVENOUS
Refills: 0 | Status: DISCONTINUED | OUTPATIENT
Start: 2024-10-21 | End: 2024-10-24

## 2024-10-21 RX ORDER — DOFETILIDE 0.25 MG/1
500 CAPSULE ORAL EVERY 12 HOURS
Refills: 0 | Status: DISCONTINUED | OUTPATIENT
Start: 2024-10-21 | End: 2024-10-21

## 2024-10-21 RX ORDER — DIPHENHYDRAMINE HCL 12.5MG/5ML
25 ELIXIR ORAL EVERY 4 HOURS
Refills: 0 | Status: DISCONTINUED | OUTPATIENT
Start: 2024-10-21 | End: 2024-10-24

## 2024-10-21 RX ORDER — DILTIAZEM HCL 5 MG/ML
360 VIAL (ML) INTRAVENOUS DAILY
Refills: 0 | Status: DISCONTINUED | OUTPATIENT
Start: 2024-10-21 | End: 2024-10-21

## 2024-10-21 RX ORDER — SENNA 187 MG
2 TABLET ORAL AT BEDTIME
Refills: 0 | Status: DISCONTINUED | OUTPATIENT
Start: 2024-10-21 | End: 2024-10-24

## 2024-10-21 RX ORDER — DIGOXIN 250 MCG
250 TABLET ORAL DAILY
Refills: 0 | Status: DISCONTINUED | OUTPATIENT
Start: 2024-10-21 | End: 2024-10-22

## 2024-10-21 RX ORDER — FLUTICASONE PROPIONATE 50 UG/1
2 SPRAY, METERED NASAL
Refills: 0 | Status: DISCONTINUED | OUTPATIENT
Start: 2024-10-21 | End: 2024-10-24

## 2024-10-21 RX ORDER — HEPARIN SODIUM 10000 [USP'U]/ML
5000 INJECTION INTRAVENOUS; SUBCUTANEOUS EVERY 8 HOURS
Refills: 0 | Status: DISCONTINUED | OUTPATIENT
Start: 2024-10-21 | End: 2024-10-23

## 2024-10-21 RX ORDER — TAMSULOSIN HCL 0.4 MG
0.4 CAPSULE ORAL AT BEDTIME
Refills: 0 | Status: DISCONTINUED | OUTPATIENT
Start: 2024-10-21 | End: 2024-10-24

## 2024-10-21 RX ORDER — POLYETHYLENE GLYCOL 3350 17 G/17G
17 POWDER, FOR SOLUTION ORAL DAILY
Refills: 0 | Status: DISCONTINUED | OUTPATIENT
Start: 2024-10-21 | End: 2024-10-24

## 2024-10-21 RX ORDER — MONTELUKAST SODIUM 10 MG/1
10 TABLET, FILM COATED ORAL AT BEDTIME
Refills: 0 | Status: DISCONTINUED | OUTPATIENT
Start: 2024-10-21 | End: 2024-10-24

## 2024-10-21 RX ORDER — FLUTICASONE PROPIONATE AND SALMETEROL XINAFOATE 230; 21 UG/1; UG/1
1 AEROSOL, METERED RESPIRATORY (INHALATION)
Refills: 0 | Status: DISCONTINUED | OUTPATIENT
Start: 2024-10-21 | End: 2024-10-24

## 2024-10-21 RX ORDER — LIDOCAINE HYDROCHLORIDE 40 MG/ML
1 SOLUTION TOPICAL DAILY
Refills: 0 | Status: DISCONTINUED | OUTPATIENT
Start: 2024-10-21 | End: 2024-10-24

## 2024-10-21 RX ORDER — HYDROMORPHONE HCL/0.9% NACL/PF 6 MG/30 ML
0.5 PATIENT CONTROLLED ANALGESIA SYRINGE INTRAVENOUS
Refills: 0 | Status: DISCONTINUED | OUTPATIENT
Start: 2024-10-21 | End: 2024-10-24

## 2024-10-21 RX ORDER — DORNASE ALFA 1 MG/ML
2.5 SOLUTION RESPIRATORY (INHALATION) DAILY
Refills: 0 | Status: DISCONTINUED | OUTPATIENT
Start: 2024-10-21 | End: 2024-10-24

## 2024-10-21 RX ORDER — ALBUTEROL 90 MCG
2 AEROSOL (GRAM) INHALATION EVERY 6 HOURS
Refills: 0 | Status: DISCONTINUED | OUTPATIENT
Start: 2024-10-21 | End: 2024-10-24

## 2024-10-21 RX ORDER — ONDANSETRON HYDROCHLORIDE 2 MG/ML
4 INJECTION, SOLUTION INTRAMUSCULAR; INTRAVENOUS EVERY 6 HOURS
Refills: 0 | Status: DISCONTINUED | OUTPATIENT
Start: 2024-10-21 | End: 2024-10-24

## 2024-10-21 RX ORDER — ACETAMINOPHEN 500 MG
1000 TABLET ORAL EVERY 6 HOURS
Refills: 0 | Status: COMPLETED | OUTPATIENT
Start: 2024-10-21 | End: 2024-10-22

## 2024-10-21 RX ORDER — SODIUM CHLORIDE 9 MG/ML
4 INJECTION, SOLUTION INTRAMUSCULAR; INTRAVENOUS; SUBCUTANEOUS EVERY 6 HOURS
Refills: 0 | Status: DISCONTINUED | OUTPATIENT
Start: 2024-10-21 | End: 2024-10-24

## 2024-10-21 RX ORDER — DILTIAZEM HCL 5 MG/ML
60 VIAL (ML) INTRAVENOUS EVERY 8 HOURS
Refills: 0 | Status: DISCONTINUED | OUTPATIENT
Start: 2024-10-21 | End: 2024-10-24

## 2024-10-21 RX ORDER — B-COMPLEX WITH VITAMIN C
1 VIAL (ML) INJECTION DAILY
Refills: 0 | Status: DISCONTINUED | OUTPATIENT
Start: 2024-10-21 | End: 2024-10-24

## 2024-10-21 RX ORDER — CETIRIZINE HCL 10 MG/1
10 TABLET ORAL DAILY
Refills: 0 | Status: DISCONTINUED | OUTPATIENT
Start: 2024-10-21 | End: 2024-10-24

## 2024-10-21 RX ORDER — PANTOPRAZOLE SODIUM 40 MG/1
40 TABLET, DELAYED RELEASE ORAL
Refills: 0 | Status: DISCONTINUED | OUTPATIENT
Start: 2024-10-21 | End: 2024-10-24

## 2024-10-21 RX ORDER — NALOXONE HYDROCHLORIDE 1 MG/ML
0.1 INJECTION, SOLUTION INTRAMUSCULAR; INTRAVENOUS; SUBCUTANEOUS
Refills: 0 | Status: DISCONTINUED | OUTPATIENT
Start: 2024-10-21 | End: 2024-10-24

## 2024-10-21 RX ADMIN — MONTELUKAST SODIUM 10 MILLIGRAM(S): 10 TABLET, FILM COATED ORAL at 22:40

## 2024-10-21 RX ADMIN — PANTOPRAZOLE SODIUM 40 MILLIGRAM(S): 40 TABLET, DELAYED RELEASE ORAL at 15:13

## 2024-10-21 RX ADMIN — Medication 975 MILLIGRAM(S): at 06:41

## 2024-10-21 RX ADMIN — Medication 1 TABLET(S): at 15:11

## 2024-10-21 RX ADMIN — Medication 60 MILLIGRAM(S): at 22:40

## 2024-10-21 RX ADMIN — HEPARIN SODIUM 5000 UNIT(S): 10000 INJECTION INTRAVENOUS; SUBCUTANEOUS at 15:12

## 2024-10-21 RX ADMIN — Medication 100 MILLIGRAM(S): at 22:40

## 2024-10-21 RX ADMIN — Medication 250 MILLILITER(S): at 11:38

## 2024-10-21 RX ADMIN — Medication 30 MILLILITER(S): at 19:48

## 2024-10-21 RX ADMIN — Medication 30 MILLILITER(S): at 06:44

## 2024-10-21 RX ADMIN — Medication 0.4 MILLIGRAM(S): at 22:40

## 2024-10-21 RX ADMIN — SODIUM CHLORIDE 4 MILLILITER(S): 9 INJECTION, SOLUTION INTRAMUSCULAR; INTRAVENOUS; SUBCUTANEOUS at 20:30

## 2024-10-21 RX ADMIN — HEPARIN SODIUM 5000 UNIT(S): 10000 INJECTION INTRAVENOUS; SUBCUTANEOUS at 22:40

## 2024-10-21 RX ADMIN — Medication 30 MILLILITER(S): at 15:12

## 2024-10-21 RX ADMIN — Medication 250 MILLILITER(S): at 19:50

## 2024-10-21 RX ADMIN — POLYETHYLENE GLYCOL 3350 17 GRAM(S): 17 POWDER, FOR SOLUTION ORAL at 15:12

## 2024-10-21 RX ADMIN — SODIUM CHLORIDE 4 MILLILITER(S): 9 INJECTION, SOLUTION INTRAMUSCULAR; INTRAVENOUS; SUBCUTANEOUS at 15:34

## 2024-10-21 RX ADMIN — Medication 10 MILLIGRAM(S): at 22:40

## 2024-10-21 RX ADMIN — FLUTICASONE PROPIONATE 2 SPRAY(S): 50 SPRAY, METERED NASAL at 17:25

## 2024-10-21 RX ADMIN — CHLORHEXIDINE GLUCONATE 1 APPLICATION(S): 40 SOLUTION TOPICAL at 15:10

## 2024-10-21 RX ADMIN — Medication 100 MILLIGRAM(S): at 15:10

## 2024-10-21 NOTE — PATIENT PROFILE ADULT - FALL HARM RISK - HARM RISK INTERVENTIONS

## 2024-10-21 NOTE — BRIEF OPERATIVE NOTE - NSICDXBRIEFPROCEDURE_GEN_ALL_CORE_FT
PROCEDURES:  Excision, pulmonary sequestration 21-Oct-2024 11:56:02  Caleb Willard  Right thoracotomy with bronchoscopy 21-Oct-2024 11:56:58 Right Lower Lobectomy / Resection of pulmonary sequestration Caleb Willard

## 2024-10-21 NOTE — BRIEF OPERATIVE NOTE - COMMENTS
I, Caleb Willard PA-C provided direct second assist support to the surgeon during this surgical procedure. My involvement included positioning, prepping and draping the patient prior to surgery, ensuring clear visibility and exposure for the surgeon by using instruments such as retractors and suction, closing surgical incisions and dressing wounds. As well as other tasks as directed by the surgeon.

## 2024-10-21 NOTE — ASU PREOP CHECKLIST - COMMENTS
pt took cardizem, digoxin, omperazole,nasal spray, ipratropium nasal spray and dofetilide at 4 am with a sip of water

## 2024-10-21 NOTE — BRIEF OPERATIVE NOTE - OPERATION/FINDINGS
Large vessel coming from descending thoracic aorta superior to the diaphragm and entering the RLL. Vascular stapler load used to transect the sequestration, RLL Lobectomy performed in usual fashion. Cryoablation.

## 2024-10-21 NOTE — PATIENT PROFILE ADULT - HAVE YOU EXPERIENCED VIOLENCE OR A TRAUMATIC EVENT?
[facility] Emergency Department  [address] [address]  Phone:  [phone]  Fax:  [phone]                                    Neelam Wolfe   MRN: [id number]    Department:  [facility] Emergency Department   Date of Visit:  10/22/2019           After Visit Summary Signature Page    I have received my discharge instructions, and my questions have been answered. I have discussed any challenges I see with this plan with the nurse or doctor.    ..........................................................................................................................................  Patient/Patient Representative Signature      ..........................................................................................................................................  Patient Representative Print Name and Relationship to Patient    ..................................................               ................................................  Date                                   Time    ..........................................................................................................................................  Reviewed by Signature/Title    ...................................................              ..............................................  Date                                               Time          22EPIC Rev 08/18        no

## 2024-10-22 LAB
ANION GAP SERPL CALC-SCNC: 11 MMOL/L — SIGNIFICANT CHANGE UP (ref 7–14)
APTT BLD: 29.7 SEC — SIGNIFICANT CHANGE UP (ref 24.5–35.6)
BUN SERPL-MCNC: 18 MG/DL — SIGNIFICANT CHANGE UP (ref 7–23)
CALCIUM SERPL-MCNC: 7.7 MG/DL — LOW (ref 8.4–10.5)
CHLORIDE SERPL-SCNC: 101 MMOL/L — SIGNIFICANT CHANGE UP (ref 98–107)
CO2 SERPL-SCNC: 22 MMOL/L — SIGNIFICANT CHANGE UP (ref 22–31)
CREAT SERPL-MCNC: 0.96 MG/DL — SIGNIFICANT CHANGE UP (ref 0.5–1.3)
DIGOXIN SERPL-MCNC: 1.8 NG/ML — SIGNIFICANT CHANGE UP (ref 0.8–2)
EGFR: 80 ML/MIN/1.73M2 — SIGNIFICANT CHANGE UP
GLUCOSE SERPL-MCNC: 138 MG/DL — HIGH (ref 70–99)
GRAM STN FLD: ABNORMAL
HCT VFR BLD CALC: 37.1 % — LOW (ref 39–50)
HGB BLD-MCNC: 12.5 G/DL — LOW (ref 13–17)
INR BLD: 1.15 RATIO — SIGNIFICANT CHANGE UP (ref 0.85–1.16)
MAGNESIUM SERPL-MCNC: 1.9 MG/DL — SIGNIFICANT CHANGE UP (ref 1.6–2.6)
MCHC RBC-ENTMCNC: 31.6 PG — SIGNIFICANT CHANGE UP (ref 27–34)
MCHC RBC-ENTMCNC: 33.7 GM/DL — SIGNIFICANT CHANGE UP (ref 32–36)
MCV RBC AUTO: 93.7 FL — SIGNIFICANT CHANGE UP (ref 80–100)
MRSA PCR RESULT.: SIGNIFICANT CHANGE UP
NIGHT BLUE STAIN TISS: SIGNIFICANT CHANGE UP
NRBC # BLD: 0 /100 WBCS — SIGNIFICANT CHANGE UP (ref 0–0)
NRBC # FLD: 0 K/UL — SIGNIFICANT CHANGE UP (ref 0–0)
PHOSPHATE SERPL-MCNC: 3.7 MG/DL — SIGNIFICANT CHANGE UP (ref 2.5–4.5)
PLATELET # BLD AUTO: 335 K/UL — SIGNIFICANT CHANGE UP (ref 150–400)
POTASSIUM SERPL-MCNC: 4.3 MMOL/L — SIGNIFICANT CHANGE UP (ref 3.5–5.3)
POTASSIUM SERPL-SCNC: 4.3 MMOL/L — SIGNIFICANT CHANGE UP (ref 3.5–5.3)
PROTHROM AB SERPL-ACNC: 13.3 SEC — SIGNIFICANT CHANGE UP (ref 9.9–13.4)
RBC # BLD: 3.96 M/UL — LOW (ref 4.2–5.8)
RBC # FLD: 12.7 % — SIGNIFICANT CHANGE UP (ref 10.3–14.5)
S AUREUS DNA NOSE QL NAA+PROBE: SIGNIFICANT CHANGE UP
SODIUM SERPL-SCNC: 134 MMOL/L — LOW (ref 135–145)
SPECIMEN SOURCE: SIGNIFICANT CHANGE UP
SPECIMEN SOURCE: SIGNIFICANT CHANGE UP
WBC # BLD: 13.97 K/UL — HIGH (ref 3.8–10.5)
WBC # FLD AUTO: 13.97 K/UL — HIGH (ref 3.8–10.5)

## 2024-10-22 PROCEDURE — 71045 X-RAY EXAM CHEST 1 VIEW: CPT | Mod: 26

## 2024-10-22 PROCEDURE — 99233 SBSQ HOSP IP/OBS HIGH 50: CPT

## 2024-10-22 PROCEDURE — 93010 ELECTROCARDIOGRAM REPORT: CPT | Mod: 76

## 2024-10-22 RX ORDER — PIPERACILLIN AND TAZOBACTAM .5; 4 G/20ML; G/20ML
3.38 INJECTION, POWDER, LYOPHILIZED, FOR SOLUTION INTRAVENOUS EVERY 8 HOURS
Refills: 0 | Status: DISCONTINUED | OUTPATIENT
Start: 2024-10-22 | End: 2024-10-24

## 2024-10-22 RX ORDER — DIGOXIN 250 MCG
125 TABLET ORAL DAILY
Refills: 0 | Status: DISCONTINUED | OUTPATIENT
Start: 2024-10-22 | End: 2024-10-24

## 2024-10-22 RX ORDER — MAGNESIUM SULFATE IN 0.9% NACL 2 G/50 ML
1 INTRAVENOUS SOLUTION, PIGGYBACK (ML) INTRAVENOUS ONCE
Refills: 0 | Status: COMPLETED | OUTPATIENT
Start: 2024-10-22 | End: 2024-10-22

## 2024-10-22 RX ORDER — PIPERACILLIN AND TAZOBACTAM .5; 4 G/20ML; G/20ML
3.38 INJECTION, POWDER, LYOPHILIZED, FOR SOLUTION INTRAVENOUS ONCE
Refills: 0 | Status: COMPLETED | OUTPATIENT
Start: 2024-10-22 | End: 2024-10-22

## 2024-10-22 RX ORDER — METOCLOPRAMIDE HCL 10 MG
10 TABLET ORAL ONCE
Refills: 0 | Status: COMPLETED | OUTPATIENT
Start: 2024-10-22 | End: 2024-10-22

## 2024-10-22 RX ORDER — DOFETILIDE 0.25 MG/1
500 CAPSULE ORAL EVERY 12 HOURS
Refills: 0 | Status: DISCONTINUED | OUTPATIENT
Start: 2024-10-22 | End: 2024-10-24

## 2024-10-22 RX ADMIN — Medication 1000 MILLIGRAM(S): at 00:26

## 2024-10-22 RX ADMIN — Medication 400 MILLIGRAM(S): at 12:48

## 2024-10-22 RX ADMIN — DOFETILIDE 500 MICROGRAM(S): 0.25 CAPSULE ORAL at 18:36

## 2024-10-22 RX ADMIN — PIPERACILLIN AND TAZOBACTAM 200 GRAM(S): .5; 4 INJECTION, POWDER, LYOPHILIZED, FOR SOLUTION INTRAVENOUS at 12:23

## 2024-10-22 RX ADMIN — Medication 250 MILLILITER(S): at 19:01

## 2024-10-22 RX ADMIN — Medication 30 MILLILITER(S): at 09:21

## 2024-10-22 RX ADMIN — MONTELUKAST SODIUM 10 MILLIGRAM(S): 10 TABLET, FILM COATED ORAL at 22:00

## 2024-10-22 RX ADMIN — Medication 1000 MILLIGRAM(S): at 06:50

## 2024-10-22 RX ADMIN — Medication 400 MILLIGRAM(S): at 00:11

## 2024-10-22 RX ADMIN — Medication 60 MILLIGRAM(S): at 13:02

## 2024-10-22 RX ADMIN — Medication 100 GRAM(S): at 06:38

## 2024-10-22 RX ADMIN — HEPARIN SODIUM 5000 UNIT(S): 10000 INJECTION INTRAVENOUS; SUBCUTANEOUS at 13:02

## 2024-10-22 RX ADMIN — SODIUM CHLORIDE 4 MILLILITER(S): 9 INJECTION, SOLUTION INTRAMUSCULAR; INTRAVENOUS; SUBCUTANEOUS at 22:43

## 2024-10-22 RX ADMIN — PIPERACILLIN AND TAZOBACTAM 3.38 GRAM(S): .5; 4 INJECTION, POWDER, LYOPHILIZED, FOR SOLUTION INTRAVENOUS at 17:54

## 2024-10-22 RX ADMIN — PIPERACILLIN AND TAZOBACTAM 25 GRAM(S): .5; 4 INJECTION, POWDER, LYOPHILIZED, FOR SOLUTION INTRAVENOUS at 22:01

## 2024-10-22 RX ADMIN — Medication 2 TABLET(S): at 01:13

## 2024-10-22 RX ADMIN — POLYETHYLENE GLYCOL 3350 17 GRAM(S): 17 POWDER, FOR SOLUTION ORAL at 12:36

## 2024-10-22 RX ADMIN — SODIUM CHLORIDE 4 MILLILITER(S): 9 INJECTION, SOLUTION INTRAMUSCULAR; INTRAVENOUS; SUBCUTANEOUS at 02:43

## 2024-10-22 RX ADMIN — Medication 1000 MILLIGRAM(S): at 13:05

## 2024-10-22 RX ADMIN — PIPERACILLIN AND TAZOBACTAM 25 GRAM(S): .5; 4 INJECTION, POWDER, LYOPHILIZED, FOR SOLUTION INTRAVENOUS at 14:41

## 2024-10-22 RX ADMIN — Medication 250 MICROGRAM(S): at 06:39

## 2024-10-22 RX ADMIN — SODIUM CHLORIDE 4 MILLILITER(S): 9 INJECTION, SOLUTION INTRAMUSCULAR; INTRAVENOUS; SUBCUTANEOUS at 09:14

## 2024-10-22 RX ADMIN — Medication 10 MILLIGRAM(S): at 22:00

## 2024-10-22 RX ADMIN — Medication 125 MICROGRAM(S): at 09:20

## 2024-10-22 RX ADMIN — Medication 1000 MILLIGRAM(S): at 18:36

## 2024-10-22 RX ADMIN — HEPARIN SODIUM 5000 UNIT(S): 10000 INJECTION INTRAVENOUS; SUBCUTANEOUS at 06:40

## 2024-10-22 RX ADMIN — Medication 2 TABLET(S): at 22:00

## 2024-10-22 RX ADMIN — PANTOPRAZOLE SODIUM 40 MILLIGRAM(S): 40 TABLET, DELAYED RELEASE ORAL at 06:39

## 2024-10-22 RX ADMIN — Medication 100 MILLIGRAM(S): at 06:38

## 2024-10-22 RX ADMIN — Medication 400 MILLIGRAM(S): at 06:39

## 2024-10-22 RX ADMIN — HEPARIN SODIUM 5000 UNIT(S): 10000 INJECTION INTRAVENOUS; SUBCUTANEOUS at 22:00

## 2024-10-22 RX ADMIN — LIDOCAINE HYDROCHLORIDE 1 PATCH: 40 SOLUTION TOPICAL at 12:35

## 2024-10-22 RX ADMIN — Medication 10 MILLIGRAM(S): at 12:28

## 2024-10-22 RX ADMIN — Medication 250 MILLILITER(S): at 22:31

## 2024-10-22 RX ADMIN — CHLORHEXIDINE GLUCONATE 1 APPLICATION(S): 40 SOLUTION TOPICAL at 13:02

## 2024-10-22 RX ADMIN — Medication 400 MILLIGRAM(S): at 18:31

## 2024-10-22 RX ADMIN — FLUTICASONE PROPIONATE 2 SPRAY(S): 50 SPRAY, METERED NASAL at 18:31

## 2024-10-22 RX ADMIN — Medication 60 MILLIGRAM(S): at 06:39

## 2024-10-22 RX ADMIN — Medication 1 TABLET(S): at 12:26

## 2024-10-22 RX ADMIN — Medication 0.4 MILLIGRAM(S): at 22:00

## 2024-10-22 RX ADMIN — Medication 250 MILLILITER(S): at 07:49

## 2024-10-22 RX ADMIN — LIDOCAINE HYDROCHLORIDE 1 PATCH: 40 SOLUTION TOPICAL at 20:06

## 2024-10-22 RX ADMIN — DORNASE ALFA 2.5 MILLIGRAM(S): 1 SOLUTION RESPIRATORY (INHALATION) at 09:13

## 2024-10-22 RX ADMIN — Medication 60 MILLIGRAM(S): at 22:00

## 2024-10-22 NOTE — PHYSICAL THERAPY INITIAL EVALUATION ADULT - REFERRAL TO ANOTHER SERVICE NEEDED, PT EVAL
Pt ambulated to the treatment area assisted by nurse. Pt keeping eyes closed states \"right eye hurts a lot since last night putting eye drops in right eye and poked his eye with the eye drops. \" Pt unable to do eye acuity at this time.
occupational therapy

## 2024-10-22 NOTE — PHYSICAL THERAPY INITIAL EVALUATION ADULT - ADDITIONAL COMMENTS
Patient lives with  in private home,  no steps to negotiate. patient was independent in all ADL prior to admission. Patient was not using an assistive device prior and was independent in all ADL prior to admission.

## 2024-10-22 NOTE — CONSULT NOTE ADULT - SUBJECTIVE AND OBJECTIVE BOX
CHIEF COMPLAINT:Patient is a 79y old  Male who presents with a chief complaint of "they're taking a piece ot the lung out, a lobectomy, it's a pulmonary sequestration, it's very rare, so it figures" (15 Oct 2024 13:41)      HISTORY OF PRESENT ILLNESS:    79 male with history as below , paf on Tikosyn s/p resection of pulm sequestration   feels well post op   no cp   no sob    PAST MEDICAL & SURGICAL HISTORY:  Atrial fibrillation      HTN (hypertension)      Seasonal allergies      Bronchiectasis      Recurrent pneumonia      GERD (gastroesophageal reflux disease)      Enlarged prostate      Left cataract      Pulmonary sequestration      Chronic cough      Emphysema/COPD      Asthma      Hearing loss      S/P T&A (status post tonsillectomy and adenoidectomy)      History of appendectomy      H/O cataract extraction              MEDICATIONS:  digoxin     Tablet 125 MICROGram(s) Oral daily  diltiazem    Tablet 60 milliGRAM(s) Oral every 8 hours  dofetilide. 500 MICROGram(s) Oral every 12 hours  heparin   Injectable 5000 Unit(s) SubCutaneous every 8 hours    ceFAZolin   IVPB 2000 milliGRAM(s) IV Intermittent every 8 hours    albuterol    90 MICROgram(s) HFA Inhaler 2 Puff(s) Inhalation every 6 hours PRN  cetirizine 10 milliGRAM(s) Oral daily PRN  diphenhydrAMINE Injectable 25 milliGRAM(s) IV Push every 4 hours PRN  dornase brice Solution 2.5 milliGRAM(s) Inhalation daily  fluticasone propionate/ salmeterol 100-50 MICROgram(s) Diskus 1 Dose(s) Inhalation two times a day  montelukast 10 milliGRAM(s) Oral at bedtime  sodium chloride 3%  Inhalation 4 milliLiter(s) Inhalation every 6 hours  tiotropium 2.5 MICROgram(s) Inhaler 2 Puff(s) Inhalation daily    acetaminophen   IVPB .. 1000 milliGRAM(s) IV Intermittent every 6 hours  HYDROmorphone  Injectable 0.5 milliGRAM(s) IV Push every 3 hours PRN  hydromorphone (10 MICROgram(s)/mL) + bupivacaine 0.0625% in 0.9% Sodium Chloride PCEA 250 milliLiter(s) Epidural PCA Continuous  hydromorphone (10 MICROgram(s)/mL) + bupivacaine 0.0625% in 0.9% Sodium Chloride PCEA Rescue Clinician  Bolus 5 milliLiter(s) Epidural every 15 minutes PRN  ondansetron Injectable 4 milliGRAM(s) IV Push every 6 hours PRN    pantoprazole    Tablet 40 milliGRAM(s) Oral before breakfast  polyethylene glycol 3350 17 Gram(s) Oral daily  senna 2 Tablet(s) Oral at bedtime    atorvastatin 10 milliGRAM(s) Oral at bedtime    chlorhexidine 2% Cloths 1 Application(s) Topical daily  fluticasone propionate 50 MICROgram(s)/spray Nasal Spray 2 Spray(s) Both Nostrils two times a day  lactated ringers. 500 milliLiter(s) IV Continuous <Continuous>  lidocaine   4% Patch 1 Patch Transdermal daily  multivitamin 1 Tablet(s) Oral daily  tamsulosin 0.4 milliGRAM(s) Oral at bedtime      FAMILY HISTORY:  FH: heart failure (Father)    FH: breast cancer (Mother, Aunt)        Non-contributory    SOCIAL HISTORY:    No tobacco, drugs or etoh    Allergies    contrast media (iodine-based) (Rash)    Intolerances    	    REVIEW OF SYSTEMS:  as above  The rest of the 14 points ROS reviewed and except above they are unremarkable.        PHYSICAL EXAM:  T(C): 36.4 (10-22-24 @ 04:00), Max: 37.1 (10-21-24 @ 13:00)  HR: 86 (10-22-24 @ 07:00) (57 - 89)  BP: 119/57 (10-22-24 @ 07:00) (116/56 - 138/58)  RR: 15 (10-22-24 @ 07:00) (11 - 24)  SpO2: 97% (10-22-24 @ 07:00) (94% - 100%)  Wt(kg): --  I&O's Summary    21 Oct 2024 07:01  -  22 Oct 2024 07:00  --------------------------------------------------------  IN: 1770 mL / OUT: 1560 mL / NET: 210 mL        JVP: Normal  Neck: supple  Lung: clear   CV: S1 S2 , Murmur:  Abd: soft  Ext: No edema  neuro: Awake / alert  Psych: flat affect  Skin: normal    LABS/DATA:    TELEMETRY: 	    ECG:  	   	  CARDIAC MARKERS:                                      12.5   13.97 )-----------( 335      ( 22 Oct 2024 03:40 )             37.1     10-22    134[L]  |  101  |  18  ----------------------------<  138[H]  4.3   |  22  |  0.96    Ca    7.7[L]      22 Oct 2024 03:40  Phos  3.7     10-22  Mg     1.90     10-22      proBNP:   Lipid Profile:   HgA1c:   TSH:

## 2024-10-22 NOTE — PHYSICAL THERAPY INITIAL EVALUATION ADULT - PERTINENT HX OF CURRENT PROBLEM, REHAB EVAL
Patient is a 79 year old male with recurrent pneumonia.  Last hospitalization was 8/2024.  Pt. was evaluated by his new Pulmonologist.  The findings were indicative of needing a CT scan.  The results showed pulmonary sequestration.

## 2024-10-22 NOTE — PHYSICAL THERAPY INITIAL EVALUATION ADULT - DID THE PATIENT HAVE SURGERY?
Excision, pulmonary sequestration, Right thoracotomy with bronchoscopy, Right Lower Lobectomy / Resection of pulmonary sequestration/yes

## 2024-10-23 ENCOUNTER — TRANSCRIPTION ENCOUNTER (OUTPATIENT)
Age: 79
End: 2024-10-23

## 2024-10-23 LAB
ANION GAP SERPL CALC-SCNC: 10 MMOL/L — SIGNIFICANT CHANGE UP (ref 7–14)
APTT BLD: 30 SEC — SIGNIFICANT CHANGE UP (ref 24.5–35.6)
BLD GP AB SCN SERPL QL: NEGATIVE — SIGNIFICANT CHANGE UP
BUN SERPL-MCNC: 18 MG/DL — SIGNIFICANT CHANGE UP (ref 7–23)
CALCIUM SERPL-MCNC: 8.3 MG/DL — LOW (ref 8.4–10.5)
CHLORIDE SERPL-SCNC: 96 MMOL/L — LOW (ref 98–107)
CO2 SERPL-SCNC: 26 MMOL/L — SIGNIFICANT CHANGE UP (ref 22–31)
CREAT SERPL-MCNC: 1 MG/DL — SIGNIFICANT CHANGE UP (ref 0.5–1.3)
EGFR: 77 ML/MIN/1.73M2 — SIGNIFICANT CHANGE UP
GLUCOSE SERPL-MCNC: 132 MG/DL — HIGH (ref 70–99)
HCT VFR BLD CALC: 36.5 % — LOW (ref 39–50)
HGB BLD-MCNC: 12.4 G/DL — LOW (ref 13–17)
INR BLD: 1.05 RATIO — SIGNIFICANT CHANGE UP (ref 0.85–1.16)
MAGNESIUM SERPL-MCNC: 2.4 MG/DL — SIGNIFICANT CHANGE UP (ref 1.6–2.6)
MCHC RBC-ENTMCNC: 31.7 PG — SIGNIFICANT CHANGE UP (ref 27–34)
MCHC RBC-ENTMCNC: 34 GM/DL — SIGNIFICANT CHANGE UP (ref 32–36)
MCV RBC AUTO: 93.4 FL — SIGNIFICANT CHANGE UP (ref 80–100)
NRBC # BLD: 0 /100 WBCS — SIGNIFICANT CHANGE UP (ref 0–0)
NRBC # FLD: 0 K/UL — SIGNIFICANT CHANGE UP (ref 0–0)
PHOSPHATE SERPL-MCNC: 2.2 MG/DL — LOW (ref 2.5–4.5)
PLATELET # BLD AUTO: 298 K/UL — SIGNIFICANT CHANGE UP (ref 150–400)
POTASSIUM SERPL-MCNC: 4.1 MMOL/L — SIGNIFICANT CHANGE UP (ref 3.5–5.3)
POTASSIUM SERPL-SCNC: 4.1 MMOL/L — SIGNIFICANT CHANGE UP (ref 3.5–5.3)
PROTHROM AB SERPL-ACNC: 12.5 SEC — SIGNIFICANT CHANGE UP (ref 9.9–13.4)
RBC # BLD: 3.91 M/UL — LOW (ref 4.2–5.8)
RBC # FLD: 12.5 % — SIGNIFICANT CHANGE UP (ref 10.3–14.5)
RH IG SCN BLD-IMP: POSITIVE — SIGNIFICANT CHANGE UP
SODIUM SERPL-SCNC: 132 MMOL/L — LOW (ref 135–145)
WBC # BLD: 11.32 K/UL — HIGH (ref 3.8–10.5)
WBC # FLD AUTO: 11.32 K/UL — HIGH (ref 3.8–10.5)

## 2024-10-23 PROCEDURE — 99233 SBSQ HOSP IP/OBS HIGH 50: CPT

## 2024-10-23 PROCEDURE — 71045 X-RAY EXAM CHEST 1 VIEW: CPT | Mod: 26

## 2024-10-23 RX ORDER — HEPARIN SODIUM 10000 [USP'U]/ML
5000 INJECTION INTRAVENOUS; SUBCUTANEOUS ONCE
Refills: 0 | Status: COMPLETED | OUTPATIENT
Start: 2024-10-23 | End: 2024-10-23

## 2024-10-23 RX ORDER — DIBASIC SODIUM PHOSPHATE, MONOBASIC POTASSIUM PHOSPHATE AND MONOBASIC SODIUM PHOSPHATE 852; 155; 130 MG/1; MG/1; MG/1
2 TABLET ORAL ONCE
Refills: 0 | Status: COMPLETED | OUTPATIENT
Start: 2024-10-23 | End: 2024-10-23

## 2024-10-23 RX ADMIN — DIBASIC SODIUM PHOSPHATE, MONOBASIC POTASSIUM PHOSPHATE AND MONOBASIC SODIUM PHOSPHATE 2 PACKET(S): 852; 155; 130 TABLET ORAL at 05:19

## 2024-10-23 RX ADMIN — FLUTICASONE PROPIONATE AND SALMETEROL XINAFOATE 1 DOSE(S): 230; 21 AEROSOL, METERED RESPIRATORY (INHALATION) at 09:31

## 2024-10-23 RX ADMIN — HEPARIN SODIUM 5000 UNIT(S): 10000 INJECTION INTRAVENOUS; SUBCUTANEOUS at 05:19

## 2024-10-23 RX ADMIN — FLUTICASONE PROPIONATE 2 SPRAY(S): 50 SPRAY, METERED NASAL at 05:19

## 2024-10-23 RX ADMIN — MONTELUKAST SODIUM 10 MILLIGRAM(S): 10 TABLET, FILM COATED ORAL at 21:48

## 2024-10-23 RX ADMIN — DORNASE ALFA 2.5 MILLIGRAM(S): 1 SOLUTION RESPIRATORY (INHALATION) at 09:31

## 2024-10-23 RX ADMIN — Medication 60 MILLIGRAM(S): at 13:42

## 2024-10-23 RX ADMIN — TIOTROPIUM BROMIDE INHALATION SPRAY 2 PUFF(S): 1.56 SPRAY, METERED RESPIRATORY (INHALATION) at 13:40

## 2024-10-23 RX ADMIN — PIPERACILLIN AND TAZOBACTAM 25 GRAM(S): .5; 4 INJECTION, POWDER, LYOPHILIZED, FOR SOLUTION INTRAVENOUS at 13:43

## 2024-10-23 RX ADMIN — Medication 2 TABLET(S): at 21:49

## 2024-10-23 RX ADMIN — DOFETILIDE 500 MICROGRAM(S): 0.25 CAPSULE ORAL at 06:28

## 2024-10-23 RX ADMIN — Medication 10 MILLIGRAM(S): at 21:49

## 2024-10-23 RX ADMIN — PANTOPRAZOLE SODIUM 40 MILLIGRAM(S): 40 TABLET, DELAYED RELEASE ORAL at 06:29

## 2024-10-23 RX ADMIN — PIPERACILLIN AND TAZOBACTAM 25 GRAM(S): .5; 4 INJECTION, POWDER, LYOPHILIZED, FOR SOLUTION INTRAVENOUS at 05:19

## 2024-10-23 RX ADMIN — SODIUM CHLORIDE 4 MILLILITER(S): 9 INJECTION, SOLUTION INTRAMUSCULAR; INTRAVENOUS; SUBCUTANEOUS at 22:41

## 2024-10-23 RX ADMIN — SODIUM CHLORIDE 4 MILLILITER(S): 9 INJECTION, SOLUTION INTRAMUSCULAR; INTRAVENOUS; SUBCUTANEOUS at 17:14

## 2024-10-23 RX ADMIN — HEPARIN SODIUM 5000 UNIT(S): 10000 INJECTION INTRAVENOUS; SUBCUTANEOUS at 13:42

## 2024-10-23 RX ADMIN — LIDOCAINE HYDROCHLORIDE 1 PATCH: 40 SOLUTION TOPICAL at 00:00

## 2024-10-23 RX ADMIN — POLYETHYLENE GLYCOL 3350 17 GRAM(S): 17 POWDER, FOR SOLUTION ORAL at 13:41

## 2024-10-23 RX ADMIN — PIPERACILLIN AND TAZOBACTAM 25 GRAM(S): .5; 4 INJECTION, POWDER, LYOPHILIZED, FOR SOLUTION INTRAVENOUS at 21:49

## 2024-10-23 RX ADMIN — HEPARIN SODIUM 5000 UNIT(S): 10000 INJECTION INTRAVENOUS; SUBCUTANEOUS at 21:48

## 2024-10-23 RX ADMIN — Medication 1 TABLET(S): at 13:42

## 2024-10-23 RX ADMIN — SODIUM CHLORIDE 4 MILLILITER(S): 9 INJECTION, SOLUTION INTRAMUSCULAR; INTRAVENOUS; SUBCUTANEOUS at 04:51

## 2024-10-23 RX ADMIN — DOFETILIDE 500 MICROGRAM(S): 0.25 CAPSULE ORAL at 17:43

## 2024-10-23 RX ADMIN — Medication 0.4 MILLIGRAM(S): at 21:48

## 2024-10-23 RX ADMIN — Medication 250 MILLILITER(S): at 10:33

## 2024-10-23 RX ADMIN — Medication 250 MILLILITER(S): at 07:27

## 2024-10-23 RX ADMIN — Medication 60 MILLIGRAM(S): at 05:19

## 2024-10-23 RX ADMIN — Medication 125 MICROGRAM(S): at 05:19

## 2024-10-23 RX ADMIN — SODIUM CHLORIDE 4 MILLILITER(S): 9 INJECTION, SOLUTION INTRAMUSCULAR; INTRAVENOUS; SUBCUTANEOUS at 09:29

## 2024-10-23 RX ADMIN — Medication 250 MILLILITER(S): at 19:35

## 2024-10-23 RX ADMIN — Medication 60 MILLIGRAM(S): at 21:48

## 2024-10-23 NOTE — DISCHARGE NOTE PROVIDER - NSDCFUADDINST_GEN_ALL_CORE_FT
Postop course was uncomplicated and she was discharged home on POD#  Follow up with Dr. Sanchez in 10-14 days. Call Thoracic Surgery office [phone] to schedule an appointment. Please, make an appt with your Primary care provider.   Blue Stitch will be removed by Dr. Sanchez in the office. You may shower in 24 hours with dressing and remove in the shower. Keep the wound clean and dry it well after showering.  It’s OK if some fluid comes out of the chest tube hole unless blood or purulent. No driving while taking pain meds. Some coughing and discomfort is expected.  ***resume eliquis 10/24 at 6pm *****    Follow up with Dr. Sanchez in 10-14 days. Call Thoracic Surgery office [phone] to schedule an appointment.   please, make an appt with your Primary care provider.   Blue Stitch will be removed by Dr. Sanchez in the office.   Shower daily and pat wounds dry  Keep the wound clean and dry    It’s OK if some fluid comes out of the chest tube hole unless blood or purulent.   No driving while taking pain meds. Some coughing and discomfort is expected.

## 2024-10-23 NOTE — DIETITIAN INITIAL EVALUATION ADULT - OTHER INFO
80 y/o male with hx HTN, HLD, COPD and GERD admitted with pulmonary sequestration now s/p right thoracotomy with RLL lobectomy and resection of pulmonary sequestration. Pt reports generally good appetite/PO intake PTA, consumes a regular diet at baseline. He confirmed NKFA, denies difficulties chewing/swallowing. Pt lives at home with his wife, independent with ADLs PTA. Notable medications PTA per chart inclusive of a multivitamin for micronutrient support.     Pt reports a good appetite/PO intake at present. Pt requires assistance with tray set up but is able to feed self independently. Last BM 10/21 per flowsheets. Ordered for Miralax 17 gm qD and senna 2 tablets qHS. Labs notable for hypophosphatemia, repleted. Pt reports that his weight had been stable PTA with  lbs and current admit wt 179 lbs. Reinforced dietary restrictions with patient and discussed food choices for optimal healing and recovery. Patient with good understanding and appreciative of visit and information provided. RDN services to remain available as needed.

## 2024-10-23 NOTE — DISCHARGE NOTE PROVIDER - NSDCMRMEDTOKEN_GEN_ALL_CORE_FT
Albuterol (Eqv-ProAir HFA) 90 mcg/inh inhalation aerosol: 2 puff(s) inhaled every 6 hours as needed for  shortness of breath and/or wheezing  atorvastatin 10 mg oral tablet: 1 tab(s) orally once a day  azelastine nasal: 2 spray(s) nasal 2 times a day  digoxin 125 mcg (0.125 mg) oral tablet: 2 tab(s) orally once a day  DilTIAZem (Eqv-Tiazac) 360 mg/24 hours oral capsule, extended release: 1 cap(s) orally once a day  dofetilide 500 mcg oral capsule: 1 cap(s) orally 2 times a day  Eliquis 5 mg oral tablet: 1 tab(s) orally 2 times a day last dose will be 10/18  fluticasone 50 mcg/inh nasal spray: 2 spray(s) in each nostril 2 times a day  ipratropium nasal: 2 spray(s) nasal 2 times a day  montelukast 10 mg oral tablet: 1 tab(s) orally once a day (at bedtime)  Multiple Vitamins oral tablet: 1 tab(s) orally once a day  omeprazole 20 mg oral delayed release capsule: 1 cap(s) orally once a day  tamsulosin 0.4 mg oral capsule: 1 cap(s) orally once a day (at bedtime)  Trelegy Ellipta 100 mcg-62.5 mcg-25 mcg/inh inhalation powder: 1 puff(s) inhaled once a day  ZyrTEC 10 mg oral tablet: 1 tab(s) orally once a day as needed for  allergy symptoms   Albuterol (Eqv-ProAir HFA) 90 mcg/inh inhalation aerosol: 2 puff(s) inhaled every 6 hours as needed for  shortness of breath and/or wheezing  atorvastatin 10 mg oral tablet: 1 tab(s) orally once a day  azelastine nasal: 2 spray(s) nasal 2 times a day  digoxin 125 mcg (0.125 mg) oral tablet: 2 tab(s) orally once a day  DilTIAZem (Eqv-Tiazac) 360 mg/24 hours oral capsule, extended release: 1 cap(s) orally once a day  dofetilide 500 mcg oral capsule: 1 cap(s) orally 2 times a day  Eliquis 5 mg oral tablet: 1 tab(s) orally 2 times a day last dose will be 10/18  fluticasone 50 mcg/inh nasal spray: 2 spray(s) in each nostril 2 times a day  ipratropium nasal: 2 spray(s) nasal 2 times a day  montelukast 10 mg oral tablet: 1 tab(s) orally once a day (at bedtime)  Multiple Vitamins oral tablet: 1 tab(s) orally once a day  omeprazole 20 mg oral delayed release capsule: 1 cap(s) orally once a day  oxyCODONE 5 mg oral tablet: 1 tab(s) orally every 4 hours as needed for Severe Pain (7 - 10) MDD: 6  polyethylene glycol 3350 oral powder for reconstitution: 17 gram(s) orally once a day  senna leaf extract oral tablet: 2 tab(s) orally once a day (at bedtime)  tamsulosin 0.4 mg oral capsule: 1 cap(s) orally once a day (at bedtime)  Trelegy Ellipta 100 mcg-62.5 mcg-25 mcg/inh inhalation powder: 1 puff(s) inhaled once a day  Tylenol 500 mg oral tablet: 2 tab(s) orally every 6 hours as needed for  mild pain  ZyrTEC 10 mg oral tablet: 1 tab(s) orally once a day as needed for  allergy symptoms

## 2024-10-23 NOTE — DISCHARGE NOTE PROVIDER - NSDCCPTREATMENT_GEN_ALL_CORE_FT
PRINCIPAL PROCEDURE  Procedure: Right thoracotomy with bronchoscopy  Findings and Treatment: Right Lower Lobectomy / Resection of pulmonary sequestration

## 2024-10-23 NOTE — DISCHARGE NOTE PROVIDER - NSDCCPCAREPLAN_GEN_ALL_CORE_FT
PRINCIPAL DISCHARGE DIAGNOSIS  Diagnosis: Lung sequestration  Assessment and Plan of Treatment:

## 2024-10-23 NOTE — DIETITIAN INITIAL EVALUATION ADULT - PERTINENT MEDS FT
MEDICATIONS  (STANDING):  atorvastatin 10 milliGRAM(s) Oral at bedtime  chlorhexidine 2% Cloths 1 Application(s) Topical daily  digoxin     Tablet 125 MICROGram(s) Oral daily  diltiazem    Tablet 60 milliGRAM(s) Oral every 8 hours  dofetilide. 500 MICROGram(s) Oral every 12 hours  dornase brice Solution 2.5 milliGRAM(s) Inhalation daily  fluticasone propionate 50 MICROgram(s)/spray Nasal Spray 2 Spray(s) Both Nostrils two times a day  fluticasone propionate/ salmeterol 100-50 MICROgram(s) Diskus 1 Dose(s) Inhalation two times a day  heparin   Injectable 5000 Unit(s) SubCutaneous every 8 hours  hydromorphone (10 MICROgram(s)/mL) + bupivacaine 0.0625% in 0.9% Sodium Chloride PCEA 250 milliLiter(s) Epidural PCA Continuous  lactated ringers. 500 milliLiter(s) (30 mL/Hr) IV Continuous <Continuous>  lidocaine   4% Patch 1 Patch Transdermal daily  montelukast 10 milliGRAM(s) Oral at bedtime  multivitamin 1 Tablet(s) Oral daily  pantoprazole    Tablet 40 milliGRAM(s) Oral before breakfast  piperacillin/tazobactam IVPB.. 3.375 Gram(s) IV Intermittent every 8 hours  polyethylene glycol 3350 17 Gram(s) Oral daily  senna 2 Tablet(s) Oral at bedtime  sodium chloride 3%  Inhalation 4 milliLiter(s) Inhalation every 6 hours  tamsulosin 0.4 milliGRAM(s) Oral at bedtime  tiotropium 2.5 MICROgram(s) Inhaler 2 Puff(s) Inhalation daily    MEDICATIONS  (PRN):  albuterol    90 MICROgram(s) HFA Inhaler 2 Puff(s) Inhalation every 6 hours PRN for shortness of breath and/or wheezing  cetirizine 10 milliGRAM(s) Oral daily PRN for allergy symptoms  diphenhydrAMINE Injectable 25 milliGRAM(s) IV Push every 4 hours PRN Pruritus  HYDROmorphone  Injectable 0.5 milliGRAM(s) IV Push every 3 hours PRN Severe Pain (7 - 10)  hydromorphone (10 MICROgram(s)/mL) + bupivacaine 0.0625% in 0.9% Sodium Chloride PCEA Rescue Clinician  Bolus 5 milliLiter(s) Epidural every 15 minutes PRN for Pain Score greater than 6  naloxone Injectable 0.1 milliGRAM(s) IV Push every 3 minutes PRN For ANY of the following changes in patient status:  A. RR LESS THAN 10 breaths per minute, B. Oxygen saturation LESS THAN 90%, C. Sedation score of 6  ondansetron Injectable 4 milliGRAM(s) IV Push every 6 hours PRN Nausea

## 2024-10-23 NOTE — DISCHARGE NOTE PROVIDER - PROVIDER TOKENS
PROVIDER:[TOKEN:[[id number]]] PROVIDER:[TOKEN:[[id number]]],PROVIDER:[TOKEN:[[id number]]]

## 2024-10-23 NOTE — DISCHARGE NOTE PROVIDER - CARE PROVIDER_API CALL
Toño Sanchez Justin  Thoracic Surgery  [address] [place] [address] [place] [address]  Phone: [phone]  Fax: [phone]  Follow Up Time:    Toño Sanchez Justin  Thoracic Surgery  [address] [place] [address], [place] [address]  Phone: [phone]  Fax: [phone]  Follow Up Time:     José Miguel Briones  Cardiovascular Disease  [address] [address]  Phone: [phone]  Fax: [phone]  Follow Up Time:

## 2024-10-23 NOTE — DISCHARGE NOTE PROVIDER - CARE PROVIDERS DIRECT ADDRESSES
,[email or web address]_Unknown

## 2024-10-23 NOTE — DISCHARGE NOTE PROVIDER - HOSPITAL COURSE
79 M, 30 pk/yr Hx, COPD, Afib on Eliquis, BPH, GERD, HTN, HLD, Recurrent PNA 2/2 RLL Pulmonary Sequestration. On 10/21/24 he is s/p FB, Right Thoracotomy, Excision of RLL Pulmonary Sequestration (RLL Lobectomy) & Cryoablation. Epidural used for pain management. Chest tube removed and post pull x-ray reviewed. Patent is stable for discharge home with continued out patient follow up. 79 M, 30 pk/yr Hx, COPD, Afib on Eliquis, BPH, GERD, HTN, HLD, Recurrent PNA 2/2 RLL Pulmonary Sequestration. On 10/21/24 he is s/p FB, Right Thoracotomy, Excision of RLL Pulmonary Sequestration (RLL Lobectomy) & Cryoablation. Epidural used for pain management. Chest tube removed and post pull x-ray reviewed. Patent is stable for discharge home with continued out patient follow up.  Pt has afib postop and was evaluated by EP. Pt decided to continue eliquis and f/u w his own EP as outpatient. Pt seen and examined by thoracic team on day of discharge and presented to attendings on morning TEAMS report.    Vital Signs Last 24 Hrs  T(C): 36.8 (24 Oct 2024 11:25), Max: 37.1 (23 Oct 2024 16:37)  T(F): 98.3 (24 Oct 2024 11:25), Max: 98.7 (23 Oct 2024 16:37)  HR: 83 (24 Oct 2024 11:25) (79 - 120)  BP: 117/50 (24 Oct 2024 11:25) (117/50 - 153/50)  BP(mean): --  RR: 18 (24 Oct 2024 11:25) (18 - 18)  SpO2: 94% (24 Oct 2024 11:25) (94% - 98%)    Parameters below as of 24 Oct 2024 11:25  Patient On (Oxygen Delivery Method): nasal cannula  O2 Flow (L/min): 2    PE  Telemetry/Alarms: Afib  General: awake and alert NAD  Neurology: A&Ox3, nonfocal, QUIROGA x 4  Respiratory: CTA B/L  CV: RRR, S1S2, no murmurs, rubs or gallops  Abdominal: Soft, NT, ND +BS, Last BM  Extremities: No edema, + peripheral pulses  Incisions: c,d,i + ecchymosis around incision

## 2024-10-23 NOTE — DIETITIAN INITIAL EVALUATION ADULT - PERTINENT LABORATORY DATA
10-23    132[L]  |  96[L]  |  18  ----------------------------<  132[H]  4.1   |  26  |  1.00    Ca    8.3[L]      23 Oct 2024 02:45  Phos  2.2     10-23  Mg     2.40     10-23

## 2024-10-23 NOTE — DISCHARGE NOTE PROVIDER - NSDCACTIVITY_GEN_ALL_CORE
No restrictions/Do not drive or operate machinery/Walking - Indoors allowed/No heavy lifting/straining/Follow Instructions Provided by your Surgical Team

## 2024-10-23 NOTE — DISCHARGE NOTE PROVIDER - NSDCFUADDAPPT_GEN_ALL_CORE_FT
Please follow w Dr Sanchez in 2 weeks  call  for appointment    Please follow with electrophysiology in the next 2 weeks  call for appointment    Please follow w cardiology Dr Briones in 2 weeks  call for appointment    Please follow with Primary Care Provider in 2 weeks  call for appointment

## 2024-10-23 NOTE — DIETITIAN INITIAL EVALUATION ADULT - NSFNSGIIOFT_GEN_A_CORE
10-22-24 @ 07:01  -  10-23-24 @ 07:00  --------------------------------------------------------  OUT:    Chest Tube (mL): 190 mL  Total OUT: 190 mL    Total NET: -190 mL      10-23-24 @ 07:01  -  10-23-24 @ 09:06  --------------------------------------------------------  OUT:    Chest Tube (mL): 50 mL  Total OUT: 50 mL    Total NET: -50 mL

## 2024-10-24 ENCOUNTER — TRANSCRIPTION ENCOUNTER (OUTPATIENT)
Age: 79
End: 2024-10-24

## 2024-10-24 VITALS — OXYGEN SATURATION: 96 % | RESPIRATION RATE: 22 BRPM

## 2024-10-24 LAB
ANION GAP SERPL CALC-SCNC: 10 MMOL/L — SIGNIFICANT CHANGE UP (ref 7–14)
APTT BLD: 28.4 SEC — SIGNIFICANT CHANGE UP (ref 24.5–35.6)
BUN SERPL-MCNC: 15 MG/DL — SIGNIFICANT CHANGE UP (ref 7–23)
CALCIUM SERPL-MCNC: 8.4 MG/DL — SIGNIFICANT CHANGE UP (ref 8.4–10.5)
CHLORIDE SERPL-SCNC: 98 MMOL/L — SIGNIFICANT CHANGE UP (ref 98–107)
CO2 SERPL-SCNC: 25 MMOL/L — SIGNIFICANT CHANGE UP (ref 22–31)
CREAT SERPL-MCNC: 0.79 MG/DL — SIGNIFICANT CHANGE UP (ref 0.5–1.3)
EGFR: 90 ML/MIN/1.73M2 — SIGNIFICANT CHANGE UP
GLUCOSE SERPL-MCNC: 123 MG/DL — HIGH (ref 70–99)
HCT VFR BLD CALC: 35.2 % — LOW (ref 39–50)
HGB BLD-MCNC: 11.7 G/DL — LOW (ref 13–17)
INR BLD: 1.01 RATIO — SIGNIFICANT CHANGE UP (ref 0.85–1.16)
MAGNESIUM SERPL-MCNC: 2.1 MG/DL — SIGNIFICANT CHANGE UP (ref 1.6–2.6)
MCHC RBC-ENTMCNC: 31.2 PG — SIGNIFICANT CHANGE UP (ref 27–34)
MCHC RBC-ENTMCNC: 33.2 GM/DL — SIGNIFICANT CHANGE UP (ref 32–36)
MCV RBC AUTO: 93.9 FL — SIGNIFICANT CHANGE UP (ref 80–100)
NRBC # BLD: 0 /100 WBCS — SIGNIFICANT CHANGE UP (ref 0–0)
NRBC # FLD: 0 K/UL — SIGNIFICANT CHANGE UP (ref 0–0)
PHOSPHATE SERPL-MCNC: 2.3 MG/DL — LOW (ref 2.5–4.5)
PLATELET # BLD AUTO: 288 K/UL — SIGNIFICANT CHANGE UP (ref 150–400)
POTASSIUM SERPL-MCNC: 4 MMOL/L — SIGNIFICANT CHANGE UP (ref 3.5–5.3)
POTASSIUM SERPL-SCNC: 4 MMOL/L — SIGNIFICANT CHANGE UP (ref 3.5–5.3)
PROTHROM AB SERPL-ACNC: 11.7 SEC — SIGNIFICANT CHANGE UP (ref 9.9–13.4)
RBC # BLD: 3.75 M/UL — LOW (ref 4.2–5.8)
RBC # FLD: 12.7 % — SIGNIFICANT CHANGE UP (ref 10.3–14.5)
SODIUM SERPL-SCNC: 133 MMOL/L — LOW (ref 135–145)
SURGICAL PATHOLOGY STUDY: SIGNIFICANT CHANGE UP
WBC # BLD: 9.9 K/UL — SIGNIFICANT CHANGE UP (ref 3.8–10.5)
WBC # FLD AUTO: 9.9 K/UL — SIGNIFICANT CHANGE UP (ref 3.8–10.5)

## 2024-10-24 PROCEDURE — 71045 X-RAY EXAM CHEST 1 VIEW: CPT | Mod: 26

## 2024-10-24 PROCEDURE — 99233 SBSQ HOSP IP/OBS HIGH 50: CPT

## 2024-10-24 RX ORDER — OXYCODONE HYDROCHLORIDE 30 MG/1
1 TABLET ORAL
Qty: 30 | Refills: 0
Start: 2024-10-24 | End: 2024-10-28

## 2024-10-24 RX ORDER — ACETAMINOPHEN 500 MG
1000 TABLET ORAL ONCE
Refills: 0 | Status: DISCONTINUED | OUTPATIENT
Start: 2024-10-24 | End: 2024-10-24

## 2024-10-24 RX ORDER — SENNA 187 MG
2 TABLET ORAL
Qty: 0 | Refills: 0 | DISCHARGE
Start: 2024-10-24

## 2024-10-24 RX ORDER — APIXABAN 5 MG/1
5 TABLET, FILM COATED ORAL EVERY 12 HOURS
Refills: 0 | Status: DISCONTINUED | OUTPATIENT
Start: 2024-10-24 | End: 2024-10-24

## 2024-10-24 RX ORDER — POLYETHYLENE GLYCOL 3350 17 G/17G
17 POWDER, FOR SOLUTION ORAL
Qty: 0 | Refills: 0 | DISCHARGE
Start: 2024-10-24

## 2024-10-24 RX ORDER — HYDROMORPHONE HCL/0.9% NACL/PF 6 MG/30 ML
0.3 PATIENT CONTROLLED ANALGESIA SYRINGE INTRAVENOUS EVERY 4 HOURS
Refills: 0 | Status: DISCONTINUED | OUTPATIENT
Start: 2024-10-24 | End: 2024-10-24

## 2024-10-24 RX ORDER — OXYCODONE HYDROCHLORIDE 30 MG/1
2.5 TABLET ORAL
Refills: 0 | Status: DISCONTINUED | OUTPATIENT
Start: 2024-10-24 | End: 2024-10-24

## 2024-10-24 RX ORDER — OXYCODONE HYDROCHLORIDE 30 MG/1
5 TABLET ORAL
Refills: 0 | Status: DISCONTINUED | OUTPATIENT
Start: 2024-10-24 | End: 2024-10-24

## 2024-10-24 RX ADMIN — PANTOPRAZOLE SODIUM 40 MILLIGRAM(S): 40 TABLET, DELAYED RELEASE ORAL at 06:30

## 2024-10-24 RX ADMIN — FLUTICASONE PROPIONATE AND SALMETEROL XINAFOATE 1 DOSE(S): 230; 21 AEROSOL, METERED RESPIRATORY (INHALATION) at 09:20

## 2024-10-24 RX ADMIN — FLUTICASONE PROPIONATE AND SALMETEROL XINAFOATE 1 DOSE(S): 230; 21 AEROSOL, METERED RESPIRATORY (INHALATION) at 00:16

## 2024-10-24 RX ADMIN — DORNASE ALFA 2.5 MILLIGRAM(S): 1 SOLUTION RESPIRATORY (INHALATION) at 10:18

## 2024-10-24 RX ADMIN — Medication 30 MILLILITER(S): at 07:56

## 2024-10-24 RX ADMIN — Medication 125 MICROGRAM(S): at 06:28

## 2024-10-24 RX ADMIN — DOFETILIDE 500 MICROGRAM(S): 0.25 CAPSULE ORAL at 06:47

## 2024-10-24 RX ADMIN — CHLORHEXIDINE GLUCONATE 1 APPLICATION(S): 40 SOLUTION TOPICAL at 12:42

## 2024-10-24 RX ADMIN — TIOTROPIUM BROMIDE INHALATION SPRAY 2 PUFF(S): 1.56 SPRAY, METERED RESPIRATORY (INHALATION) at 09:19

## 2024-10-24 RX ADMIN — Medication 1 TABLET(S): at 11:55

## 2024-10-24 RX ADMIN — Medication 60 MILLIGRAM(S): at 13:57

## 2024-10-24 RX ADMIN — LIDOCAINE HYDROCHLORIDE 1 PATCH: 40 SOLUTION TOPICAL at 11:53

## 2024-10-24 RX ADMIN — FLUTICASONE PROPIONATE 2 SPRAY(S): 50 SPRAY, METERED NASAL at 06:28

## 2024-10-24 RX ADMIN — Medication 250 MILLILITER(S): at 07:55

## 2024-10-24 RX ADMIN — SODIUM CHLORIDE 4 MILLILITER(S): 9 INJECTION, SOLUTION INTRAMUSCULAR; INTRAVENOUS; SUBCUTANEOUS at 10:13

## 2024-10-24 RX ADMIN — POLYETHYLENE GLYCOL 3350 17 GRAM(S): 17 POWDER, FOR SOLUTION ORAL at 11:55

## 2024-10-24 RX ADMIN — Medication 60 MILLIGRAM(S): at 06:28

## 2024-10-24 RX ADMIN — PIPERACILLIN AND TAZOBACTAM 25 GRAM(S): .5; 4 INJECTION, POWDER, LYOPHILIZED, FOR SOLUTION INTRAVENOUS at 06:27

## 2024-10-24 NOTE — DISCHARGE NOTE NURSING/CASE MANAGEMENT/SOCIAL WORK - NSDCPNINST_GEN_ALL_CORE
Please call provider for a temperature of 100.4F or greater, chills, drainage at incision sites, or pain not relieved by pain medicine.

## 2024-10-24 NOTE — CONSULT NOTE ADULT - ASSESSMENT
PAF  resume Dofetilide   cont cardizem   dec digoxin to 0.125 daily   resume a/c once deemed safe as per CTS  avoid qt prolonging drugs   keep K, Mg wnl      Advanced care planning was discussed with patient and family.  Risks, benefits and alternatives of the cardiac treatments and medical therapy including procedures were discussed in detail and all questions were answered. Importance of compliance with medical therapy and lifestyle modification to improve cardiovascular health were addressed. Appropriate forms and patient educational materials were reviewed. 30 minutes face to face spent.  
79yoM w/ PMHx COPD, Afib on eliquis and tikosyn, BPH, GERD, HTN, HLD, recurrent PNA 2/2 RLL pulmonary sequestration s/p right thoracotomy, RLL lobectomy and cryoablation.  Patient noted post-op to be in afib.  Was first diagnosed with afib in 2011 and had a DCCV.  Remain in sinus until 2015 and after multiple unsuccessful DCCV was put on Tikosyn.  Has remained in sinus up until now.  Sees an electrophysiologist at [facility].  Only symptom he is endorsing is mild fatigue but denies any chest pain, palpitations, lightheadedness, dizziness, syncope and near syncope.  Currently on telemetry, remains ins afib rate controlled VR 70-90s, vitals otherwise WNL.    Afib Management   Currently rate controlled afib   -Continue Tikosyn 500mg PO BID  -Continue digoxin 125mcg PO Daily  -On cardizem 60mg PO q8 hours - can transition to long acting prior to discharge  -Resume home dose of Eliquis once cleared by primary team to do so  -Discussed with patient regarding an HANNAH/DCCV however patient prefers to restart his AC and follow up with outpatient electrophysiologist at this time

## 2024-10-24 NOTE — CONSULT NOTE ADULT - SUBJECTIVE AND OBJECTIVE BOX
Date of Admission: 10/21/2024    CHIEF COMPLAINT: Thoracic surgery    HISTORY OF PRESENT ILLNESS:  This is a 79yoM w/ PMHx COPD, Afib on eliquis and tikosyn, BPH, GERD, HTN, HLD, recurrent PNA 2/2 RLL pulmonary sequestration s/p right thoracotomy, RLL lobectomy and cryoablation.  Patient noted post-op to be in afib.  Was first diagnosed with afib in 2011 and had a DCCV.  Remain in sinus until 2015 and after multiple unsuccessful DCCV was put on Tikosyn.  Has remained in sinus up until now.  Sees an electrophysiologist at [place].  Only symptom he is endorsing is mild fatigue but denies any chest pain, palpitations, lightheadedness, dizziness, syncope and near syncope.  Currently on telemetry, remains ins afib rate controlled VR 70-90s, vitals otherwise WNL.    Allergies  contrast media (iodine-based) (Rash)  Intolerances    MEDICATIONS:  digoxin     Tablet 125 MICROGram(s) Oral daily  diltiazem    Tablet 60 milliGRAM(s) Oral every 8 hours  dofetilide. 500 MICROGram(s) Oral every 12 hours  piperacillin/tazobactam IVPB.. 3.375 Gram(s) IV Intermittent every 8 hours  albuterol    90 MICROgram(s) HFA Inhaler 2 Puff(s) Inhalation every 6 hours PRN  cetirizine 10 milliGRAM(s) Oral daily PRN  diphenhydrAMINE Injectable 25 milliGRAM(s) IV Push every 4 hours PRN  dornase brice Solution 2.5 milliGRAM(s) Inhalation daily  fluticasone propionate/ salmeterol 100-50 MICROgram(s) Diskus 1 Dose(s) Inhalation two times a day  montelukast 10 milliGRAM(s) Oral at bedtime  sodium chloride 3%  Inhalation 4 milliLiter(s) Inhalation every 6 hours  tiotropium 2.5 MICROgram(s) Inhaler 2 Puff(s) Inhalation daily  acetaminophen   IVPB .. 1000 milliGRAM(s) IV Intermittent once  HYDROmorphone  Injectable 0.3 milliGRAM(s) IV Push every 4 hours PRN  ondansetron Injectable 4 milliGRAM(s) IV Push every 6 hours PRN  oxyCODONE    IR 5 milliGRAM(s) Oral every 3 hours PRN  oxyCODONE    IR 2.5 milliGRAM(s) Oral every 3 hours PRN  pantoprazole    Tablet 40 milliGRAM(s) Oral before breakfast  polyethylene glycol 3350 17 Gram(s) Oral daily  senna 2 Tablet(s) Oral at bedtime  atorvastatin 10 milliGRAM(s) Oral at bedtime  chlorhexidine 2% Cloths 1 Application(s) Topical daily  fluticasone propionate 50 MICROgram(s)/spray Nasal Spray 2 Spray(s) Both Nostrils two times a day  lactated ringers. 500 milliLiter(s) IV Continuous <Continuous>  lidocaine   4% Patch 1 Patch Transdermal daily  multivitamin 1 Tablet(s) Oral daily  tamsulosin 0.4 milliGRAM(s) Oral at bedtime    PAST MEDICAL & SURGICAL HISTORY:  Atrial fibrillation  HTN (hypertension)  Seasonal allergies  Bronchiectasis  Recurrent pneumonia  GERD (gastroesophageal reflux disease)  Enlarged prostate  Left cataract  Pulmonary sequestration  Chronic cough  Emphysema/COPD  Asthma  Hearing loss  S/P T&A (status post tonsillectomy and adenoidectomy)  History of appendectomy  H/O cataract extraction    FAMILY HISTORY:  FH: heart failure (Father)  FH: breast cancer (Mother, Aunt)    REVIEW OF SYSTEMS:  See HPI. Otherwise, 10 point ROS done and otherwise negative.    PHYSICAL EXAM:  T(C): 36.6 (10-24-24 @ 08:23), Max: 37.1 (10-23-24 @ 16:37)  HR: 120 (10-24-24 @ 08:28) (79 - 120)  BP: 126/50 (10-24-24 @ 08:23) (126/50 - 153/50)  RR: 18 (10-24-24 @ 08:23) (18 - 18)  SpO2: 96% (10-24-24 @ 08:23) (95% - 98%)  Wt(kg): --  I&O's Summary    23 Oct 2024 07:01  -  24 Oct 2024 07:00  --------------------------------------------------------  IN: 390 mL / OUT: 1430 mL / NET: -1040 mL    Appearance: Normal	  HEENT:   Normal oral mucosa, PERRL, EOMI	  Lymphatic: No lymphadenopathy  Cardiovascular: No JVD, No murmurs, No edema  Respiratory: Lungs clear to auscultation	  Psychiatry: A & O x 3, Mood & affect appropriate  Gastrointestinal:  Soft, Non-tender, + BS	  Skin: No rashes, No ecchymoses, No cyanosis	  Neurologic: Non-focal  Extremities: Normal range of motion, No clubbing, cyanosis or edema  Vascular: Peripheral pulses palpable 2+ bilaterally    LABS:	 	  CBC Full  -  ( 24 Oct 2024 05:38 )  WBC Count : 9.90 K/uL  Hemoglobin : 11.7 g/dL  Hematocrit : 35.2 %  Platelet Count - Automated : 288 K/uL  Mean Cell Volume : 93.9 fL  Mean Cell Hemoglobin : 31.2 pg  Mean Cell Hemoglobin Concentration : 33.2 gm/dL  Auto Neutrophil # : x  Auto Lymphocyte # : x  Auto Monocyte # : x  Auto Eosinophil # : x  Auto Basophil # : x  Auto Neutrophil % : x  Auto Lymphocyte % : x  Auto Monocyte % : x  Auto Eosinophil % : x  Auto Basophil % : x    10-24    133[L]  |  98  |  15  ----------------------------<  123[H]  4.0   |  25  |  0.79  10-23    132[L]  |  96[L]  |  18  ----------------------------<  132[H]  4.1   |  26  |  1.00    Ca    8.4      24 Oct 2024 05:38  Ca    8.3[L]      23 Oct 2024 02:45  Phos  2.3     10-24  Phos  2.2     10-23  Mg     2.10     10-24  Mg     2.40     10-23

## 2024-10-24 NOTE — DISCHARGE NOTE NURSING/CASE MANAGEMENT/SOCIAL WORK - FINANCIAL ASSISTANCE
[facility] provides services at a reduced cost to those who are determined to be eligible through [facility]’s financial assistance program. Information regarding [facility]’s financial assistance program can be found by going to [email or web address] or by calling [phone].

## 2024-10-24 NOTE — PROGRESS NOTE ADULT - PROVIDER SPECIALTY LIST ADULT
Anesthesia
Anesthesia
Critical Care
Critical Care
Pain Medicine
Cardiology
Cardiology
Critical Care
Pain Medicine
Thoracic Surgery

## 2024-10-24 NOTE — PROVIDER CONTACT NOTE (OTHER) - BACKGROUND
S/p bronchoscopy, R. thoracotomy, & RL Lobectomy 10/21  hx. a.fib, Pt went from NS to a. fib throughout the night.

## 2024-10-24 NOTE — PROGRESS NOTE ADULT - SUBJECTIVE AND OBJECTIVE BOX
Subjective: Patient seen and examined. No new events except as noted.     SUBJECTIVE/ROS:  No chest pain, dyspnea, palpitation, or dizziness.       MEDICATIONS:  MEDICATIONS  (STANDING):  atorvastatin 10 milliGRAM(s) Oral at bedtime  chlorhexidine 2% Cloths 1 Application(s) Topical daily  digoxin     Tablet 125 MICROGram(s) Oral daily  diltiazem    Tablet 60 milliGRAM(s) Oral every 8 hours  dofetilide. 500 MICROGram(s) Oral every 12 hours  dornase brice Solution 2.5 milliGRAM(s) Inhalation daily  fluticasone propionate 50 MICROgram(s)/spray Nasal Spray 2 Spray(s) Both Nostrils two times a day  fluticasone propionate/ salmeterol 100-50 MICROgram(s) Diskus 1 Dose(s) Inhalation two times a day  heparin   Injectable 5000 Unit(s) SubCutaneous every 8 hours  hydromorphone (10 MICROgram(s)/mL) + bupivacaine 0.0625% in 0.9% Sodium Chloride PCEA 250 milliLiter(s) Epidural PCA Continuous  lactated ringers. 500 milliLiter(s) (30 mL/Hr) IV Continuous <Continuous>  lidocaine   4% Patch 1 Patch Transdermal daily  montelukast 10 milliGRAM(s) Oral at bedtime  multivitamin 1 Tablet(s) Oral daily  pantoprazole    Tablet 40 milliGRAM(s) Oral before breakfast  piperacillin/tazobactam IVPB.. 3.375 Gram(s) IV Intermittent every 8 hours  polyethylene glycol 3350 17 Gram(s) Oral daily  senna 2 Tablet(s) Oral at bedtime  sodium chloride 3%  Inhalation 4 milliLiter(s) Inhalation every 6 hours  tamsulosin 0.4 milliGRAM(s) Oral at bedtime  tiotropium 2.5 MICROgram(s) Inhaler 2 Puff(s) Inhalation daily      PHYSICAL EXAM:  T(C): 36.9 (10-23-24 @ 04:00), Max: 36.9 (10-23-24 @ 04:00)  HR: 97 (10-23-24 @ 08:00) (70 - 101)  BP: 141/55 (10-23-24 @ 08:00) (111/52 - 150/62)  RR: 19 (10-23-24 @ 08:00) (12 - 21)  SpO2: 91% (10-23-24 @ 08:00) (91% - 100%)  Wt(kg): --  I&O's Summary    22 Oct 2024 07:01  -  23 Oct 2024 07:00  --------------------------------------------------------  IN: 2260 mL / OUT: 1575 mL / NET: 685 mL    23 Oct 2024 07:01  -  23 Oct 2024 08:35  --------------------------------------------------------  IN: 30 mL / OUT: 125 mL / NET: -95 mL            JVP: Normal  Neck: supple  Lung: clear   CV: S1 S2 , Murmur:  Abd: soft  Ext: No edema  neuro: Awake / alert  Psych: flat affect  Skin: normal``    LABS/DATA:    CARDIAC MARKERS:                                12.4   11.32 )-----------( 298      ( 23 Oct 2024 02:45 )             36.5     10-23    132[L]  |  96[L]  |  18  ----------------------------<  132[H]  4.1   |  26  |  1.00    Ca    8.3[L]      23 Oct 2024 02:45  Phos  2.2     10-23  Mg     2.40     10-23      proBNP:   Lipid Profile:   HgA1c:   TSH:     TELE:  EKG:        
   Subjective: patient seen and examined with thoracic surgery team  pt without acute complaints  pain controlled; plan for anesthesia to DC PCA today  pt denies chest pain or shortness of breath  using incentive spirometer  on bowel regimen, +flatus, +BM  ambulatory with assistance  Dr Briones requesting EP consult for postop afib  d/w attending on morning TEAMS report      Vital Signs:  Vital Signs Last 24 Hrs  T(C): 37.1 (10-24-24 @ 05:36), Max: 37.1 (10-23-24 @ 16:37)  T(F): 98.7 (10-24-24 @ 05:36), Max: 98.7 (10-23-24 @ 16:37)  HR: 83 (10-24-24 @ 05:36) (75 - 101)  BP: 144/71 (10-24-24 @ 05:36) (126/50 - 153/50)  RR: 18 (10-24-24 @ 05:36) (18 - 22)  SpO2: 95% (10-24-24 @ 05:36) (95% - 99%) on (O2)      PE  Telemetry/Alarms: Afib  General: awake and alert NAD  Neurology: A&Ox3, nonfocal, QUIROGA x 4  Respiratory: CTA B/L  CV: RRR, S1S2, no murmurs, rubs or gallops  Abdominal: Soft, NT, ND +BS, Last BM  Extremities: No edema, + peripheral pulses  Incisions: c,d,i + ecchymosis around incision    Relevant labs, radiology and Medications reviewed                        11.7   9.90  )-----------( 288      ( 24 Oct 2024 05:38 )             35.2     10-24    133[L]  |  98  |  15  ----------------------------<  123[H]  4.0   |  25  |  0.79    Ca    8.4      24 Oct 2024 05:38  Phos  2.3     10-24  Mg     2.10     10-24      PT/INR - ( 24 Oct 2024 05:38 )   PT: 11.7 sec;   INR: 1.01 ratio         PTT - ( 24 Oct 2024 05:38 )  PTT:28.4 sec  MEDICATIONS  (STANDING):  atorvastatin 10 milliGRAM(s) Oral at bedtime  chlorhexidine 2% Cloths 1 Application(s) Topical daily  digoxin     Tablet 125 MICROGram(s) Oral daily  diltiazem    Tablet 60 milliGRAM(s) Oral every 8 hours  dofetilide. 500 MICROGram(s) Oral every 12 hours  dornase brice Solution 2.5 milliGRAM(s) Inhalation daily  fluticasone propionate 50 MICROgram(s)/spray Nasal Spray 2 Spray(s) Both Nostrils two times a day  fluticasone propionate/ salmeterol 100-50 MICROgram(s) Diskus 1 Dose(s) Inhalation two times a day  hydromorphone (10 MICROgram(s)/mL) + bupivacaine 0.0625% in 0.9% Sodium Chloride PCEA 250 milliLiter(s) Epidural PCA Continuous  lactated ringers. 500 milliLiter(s) (30 mL/Hr) IV Continuous <Continuous>  lidocaine   4% Patch 1 Patch Transdermal daily  montelukast 10 milliGRAM(s) Oral at bedtime  multivitamin 1 Tablet(s) Oral daily  pantoprazole    Tablet 40 milliGRAM(s) Oral before breakfast  piperacillin/tazobactam IVPB.. 3.375 Gram(s) IV Intermittent every 8 hours  polyethylene glycol 3350 17 Gram(s) Oral daily  senna 2 Tablet(s) Oral at bedtime  sodium chloride 3%  Inhalation 4 milliLiter(s) Inhalation every 6 hours  tamsulosin 0.4 milliGRAM(s) Oral at bedtime  tiotropium 2.5 MICROgram(s) Inhaler 2 Puff(s) Inhalation daily    MEDICATIONS  (PRN):  albuterol    90 MICROgram(s) HFA Inhaler 2 Puff(s) Inhalation every 6 hours PRN for shortness of breath and/or wheezing  cetirizine 10 milliGRAM(s) Oral daily PRN for allergy symptoms  diphenhydrAMINE Injectable 25 milliGRAM(s) IV Push every 4 hours PRN Pruritus  HYDROmorphone  Injectable 0.5 milliGRAM(s) IV Push every 3 hours PRN Severe Pain (7 - 10)  hydromorphone (10 MICROgram(s)/mL) + bupivacaine 0.0625% in 0.9% Sodium Chloride PCEA Rescue Clinician  Bolus 5 milliLiter(s) Epidural every 15 minutes PRN for Pain Score greater than 6  naloxone Injectable 0.1 milliGRAM(s) IV Push every 3 minutes PRN For ANY of the following changes in patient status:  A. RR LESS THAN 10 breaths per minute, B. Oxygen saturation LESS THAN 90%, C. Sedation score of 6  ondansetron Injectable 4 milliGRAM(s) IV Push every 6 hours PRN Nausea    Pertinent Physical Exam  I&O's Summary    23 Oct 2024 07:01  -  24 Oct 2024 07:00  --------------------------------------------------------  IN: 390 mL / OUT: 1430 mL / NET: -1040 mL            PAST MEDICAL & SURGICAL HISTORY:  Atrial fibrillation      HTN (hypertension)      Seasonal allergies      Bronchiectasis      Recurrent pneumonia      GERD (gastroesophageal reflux disease)      Enlarged prostate      Left cataract      Pulmonary sequestration      Chronic cough      Emphysema/COPD      Asthma      Hearing loss      S/P T&A (status post tonsillectomy and adenoidectomy)      History of appendectomy      H/O cataract extraction      ASSESSMENT  79 M, 30 pk/yr Hx, COPD, Afib on Eliquis, BPH, GERD, HTN, HLD, Recurrent PNA 2/2 RLL Pulmonary Sequestration. On 10/21/24 he is s/p FB, Right Thoracotomy, Excision of RLL Pulmonary Sequestration (RLL Lobectomy) & Cryoablation. Postop pt in Afib, followed by cardiology Dr Briones who is recommending EP consult for HANNAH DCCV.    PLAN  Neuro: Pain management  Pulm: Encourage coughing, deep breathing and use of incentive spirometry. Wean off supplemental oxygen as able. Daily CXR.   Cardio: Monitor telemetry/alarms  if afib now    on Dofetilide   on cardizem   on dig  Ep consult to consider HANNAH DCCV   GI: Tolerating diet. Continue stool softeners.  Renal: monitor urine output, supplement electrolytes as needed  Vasc: S[facility] on hold to remove PCEA today  pt takes eliquis at home  no objection to resuming AC after PCEA removed  will f/u EP  Heme: Stable H/H. .   ID: Off antibiotics. Stable.  Therapy: OOB/ambulate  Disposition: Aim to D/C to home once cleared by cardiology / EP.  Discussed with Cardiothoracic Team at AM rounds.  
Anesthesia Pain Management Service    SUBJECTIVE: Pt doing well with PCEA removed & no problems reported.    Therapy:	  [ ] IV PCA	   [ X] Epidural stopped          [ ] s/p Spinal Opoid              [ ] Postpartum infusion	  [ ] Patient controlled regional anesthesia (PCRA)    [ ] prn Analgesics    Allergies    contrast media (iodine-based) (Rash)    Intolerances      MEDICATIONS  (STANDING):  acetaminophen   IVPB .. 1000 milliGRAM(s) IV Intermittent once  apixaban 5 milliGRAM(s) Oral every 12 hours  atorvastatin 10 milliGRAM(s) Oral at bedtime  chlorhexidine 2% Cloths 1 Application(s) Topical daily  digoxin     Tablet 125 MICROGram(s) Oral daily  diltiazem    Tablet 60 milliGRAM(s) Oral every 8 hours  dofetilide. 500 MICROGram(s) Oral every 12 hours  dornase brice Solution 2.5 milliGRAM(s) Inhalation daily  fluticasone propionate 50 MICROgram(s)/spray Nasal Spray 2 Spray(s) Both Nostrils two times a day  fluticasone propionate/ salmeterol 100-50 MICROgram(s) Diskus 1 Dose(s) Inhalation two times a day  lidocaine   4% Patch 1 Patch Transdermal daily  montelukast 10 milliGRAM(s) Oral at bedtime  multivitamin 1 Tablet(s) Oral daily  pantoprazole    Tablet 40 milliGRAM(s) Oral before breakfast  piperacillin/tazobactam IVPB.. 3.375 Gram(s) IV Intermittent every 8 hours  polyethylene glycol 3350 17 Gram(s) Oral daily  senna 2 Tablet(s) Oral at bedtime  sodium chloride 3%  Inhalation 4 milliLiter(s) Inhalation every 6 hours  tamsulosin 0.4 milliGRAM(s) Oral at bedtime  tiotropium 2.5 MICROgram(s) Inhaler 2 Puff(s) Inhalation daily    MEDICATIONS  (PRN):  albuterol    90 MICROgram(s) HFA Inhaler 2 Puff(s) Inhalation every 6 hours PRN for shortness of breath and/or wheezing  cetirizine 10 milliGRAM(s) Oral daily PRN for allergy symptoms  diphenhydrAMINE Injectable 25 milliGRAM(s) IV Push every 4 hours PRN Pruritus  HYDROmorphone  Injectable 0.3 milliGRAM(s) IV Push every 4 hours PRN Severe Breakthrough Pain (7 - 10)  naloxone Injectable 0.1 milliGRAM(s) IV Push every 3 minutes PRN For ANY of the following changes in patient status:  A. RR LESS THAN 10 breaths per minute, B. Oxygen saturation LESS THAN 90%, C. Sedation score of 6  ondansetron Injectable 4 milliGRAM(s) IV Push every 6 hours PRN Nausea  oxyCODONE    IR 5 milliGRAM(s) Oral every 3 hours PRN Severe Pain (7 - 10)  oxyCODONE    IR 2.5 milliGRAM(s) Oral every 3 hours PRN Moderate Pain (4 - 6)      OBJECTIVE:   [X] No new signs     [ ] Other:    Side Effects:  [X ] None			[ ] Other:    Assessment of Catheter Site:		[ ] Intact		[ ] Other:    ASSESSMENT/PLAN  [ ] Continue current therapy    [X ] Therapy changed to:    [ ] IV PCA       [ ] Epidural     [ X] prn Analgesics     Comments: Epidural stopped & now to go on oral/IV opioids & adjuvant medication as needed.     Progress Note written now but Patient was seen earlier.
Anesthesia Pain Management Service: Post Op Day _1_ of Epidural    SUBJECTIVE: Patient doing well with PCEA and no problems. Denies headache, numbness and tingling.  Pain Scale Score:   3/10  THERAPY:  [x ] Epidural Bupivacaine 0.0625% and Hydromorphone  		[ X] 10 micrograms/mL	[ ] 5 micrograms/mL  [ ] Epidural Bupivacaine 0.0625% and Fentanyl - 2 micrograms/mL  [ ] Epidural Ropivacaine 0.1% plain – 1 mg/mL  [ ] Patient Controlled Regional Anesthesia (PCRA) Ropivacaine  		[ ] 0.2%			[ ] 0.1%    Demand dose __3_ lockout __15_ (minutes) Continuous Rate _6__ Total: 159.25____ ml used (in past 24 hours)      MEDICATIONS  (STANDING):  acetaminophen   IVPB .. 1000 milliGRAM(s) IV Intermittent every 6 hours  atorvastatin 10 milliGRAM(s) Oral at bedtime  chlorhexidine 2% Cloths 1 Application(s) Topical daily  digoxin     Tablet 125 MICROGram(s) Oral daily  diltiazem    Tablet 60 milliGRAM(s) Oral every 8 hours  dofetilide. 500 MICROGram(s) Oral every 12 hours  dornase brice Solution 2.5 milliGRAM(s) Inhalation daily  fluticasone propionate 50 MICROgram(s)/spray Nasal Spray 2 Spray(s) Both Nostrils two times a day  fluticasone propionate/ salmeterol 100-50 MICROgram(s) Diskus 1 Dose(s) Inhalation two times a day  heparin   Injectable 5000 Unit(s) SubCutaneous every 8 hours  hydromorphone (10 MICROgram(s)/mL) + bupivacaine 0.0625% in 0.9% Sodium Chloride PCEA 250 milliLiter(s) Epidural PCA Continuous  lactated ringers. 500 milliLiter(s) (30 mL/Hr) IV Continuous <Continuous>  lidocaine   4% Patch 1 Patch Transdermal daily  metoclopramide Injectable 10 milliGRAM(s) IV Push once  montelukast 10 milliGRAM(s) Oral at bedtime  multivitamin 1 Tablet(s) Oral daily  pantoprazole    Tablet 40 milliGRAM(s) Oral before breakfast  piperacillin/tazobactam IVPB. 3.375 Gram(s) IV Intermittent once  piperacillin/tazobactam IVPB.- 3.375 Gram(s) IV Intermittent once  piperacillin/tazobactam IVPB.. 3.375 Gram(s) IV Intermittent every 8 hours  polyethylene glycol 3350 17 Gram(s) Oral daily  senna 2 Tablet(s) Oral at bedtime  sodium chloride 3%  Inhalation 4 milliLiter(s) Inhalation every 6 hours  tamsulosin 0.4 milliGRAM(s) Oral at bedtime  tiotropium 2.5 MICROgram(s) Inhaler 2 Puff(s) Inhalation daily    MEDICATIONS  (PRN):  albuterol    90 MICROgram(s) HFA Inhaler 2 Puff(s) Inhalation every 6 hours PRN for shortness of breath and/or wheezing  cetirizine 10 milliGRAM(s) Oral daily PRN for allergy symptoms  diphenhydrAMINE Injectable 25 milliGRAM(s) IV Push every 4 hours PRN Pruritus  HYDROmorphone  Injectable 0.5 milliGRAM(s) IV Push every 3 hours PRN Severe Pain (7 - 10)  hydromorphone (10 MICROgram(s)/mL) + bupivacaine 0.0625% in 0.9% Sodium Chloride PCEA Rescue Clinician  Bolus 5 milliLiter(s) Epidural every 15 minutes PRN for Pain Score greater than 6  naloxone Injectable 0.1 milliGRAM(s) IV Push every 3 minutes PRN For ANY of the following changes in patient status:  A. RR LESS THAN 10 breaths per minute, B. Oxygen saturation LESS THAN 90%, C. Sedation score of 6  ondansetron Injectable 4 milliGRAM(s) IV Push every 6 hours PRN Nausea      OBJECTIVE: Patient sitting up in chair. CTX1 in place.    Assessment of Catheter Site:	[ ] Left	[ ] Right  [x ] Epidural 	[ ] Femoral	      [ ] Saphenous   [ ] Supraclavicular   [ ] Other:    [x ] Dressing intact	[x ] Site non-tender	[ x] Site without erythema, discharge, edema  [x ] Epidural tubing and connection checked	[x] Gross neurological exam within normal limits  [ ] Catheter removed – tip intact		[ ] Afebrile  	[ ] Febrile: ___   [ X] see Temp under VS below)    PT/INR - ( 22 Oct 2024 03:40 )   PT: 13.3 sec;   INR: 1.15 ratio         PTT - ( 22 Oct 2024 03:40 )  PTT:29.7 sec                      12.5   13.97 )-----------( 335      ( 22 Oct 2024 03:40 )             37.1     Vital Signs Last 24 Hrs  T(C): 36.4 (10-22-24 @ 08:00), Max: 37.1 (10-21-24 @ 13:00)  T(F): 97.6 (10-22-24 @ 08:00), Max: 98.7 (10-21-24 @ 13:00)  HR: 87 (10-22-24 @ 11:00) (63 - 89)  BP: 130/60 (10-22-24 @ 11:00) (116/56 - 138/58)  BP(mean): 81 (10-22-24 @ 11:00) (68 - 103)  RR: 20 (10-22-24 @ 11:00) (11 - 24)  SpO2: 95% (10-22-24 @ 11:00) (93% - 100%)      Sedation Score:	[x ] Alert	[ ] Drowsy	[ ] Arousable	[ ] Asleep	[ ] Unresponsive    Side Effects:	[x ] None	[ ] Nausea	[ ] Vomiting	[ ] Pruritus  		[ ] Weakness		[ ] Numbness	[ ] Other:    ASSESSMENT/ PLAN:    Therapy to  be:	[x ] Continue   [ ] Discontinued   [ ] Change to prn Analgesics    Documentation and Verification of current medications:  [ X ] Done	[ ] Not done, not eligible, reason:    Comments: Doing OK with epidural and may continue.    Progress Note written now but Patient was seen earlier.
Anesthesia Pain Management Service: [Post Op Day _2_ of Epidural    SUBJECTIVE: Patient doing well with PCEA and no problems.  Pain Scale Score:   Refer to charted pain scores    THERAPY:  [x ] Epidural Bupivacaine 0.0625% and Hydromorphone  		[ X] 10 micrograms/mL	[ ] 5 micrograms/mL  [ ] Epidural Bupivacaine 0.0625% and Fentanyl - 2 micrograms/mL  [ ] Epidural Ropivacaine 0.1% plain – 1 mg/mL  [ ] Patient Controlled Regional Anesthesia (PCRA) Ropivacaine  		[ ] 0.2%			[ ] 0.1%    Demand dose __3_ lockout __15_ (minutes) Continuous Rate _6__ Total: __143.85__ ml used (in past 24 hours)      MEDICATIONS  (STANDING):  atorvastatin 10 milliGRAM(s) Oral at bedtime  chlorhexidine 2% Cloths 1 Application(s) Topical daily  digoxin     Tablet 125 MICROGram(s) Oral daily  diltiazem    Tablet 60 milliGRAM(s) Oral every 8 hours  dofetilide. 500 MICROGram(s) Oral every 12 hours  dornase brice Solution 2.5 milliGRAM(s) Inhalation daily  fluticasone propionate 50 MICROgram(s)/spray Nasal Spray 2 Spray(s) Both Nostrils two times a day  fluticasone propionate/ salmeterol 100-50 MICROgram(s) Diskus 1 Dose(s) Inhalation two times a day  heparin   Injectable 5000 Unit(s) SubCutaneous every 8 hours  hydromorphone (10 MICROgram(s)/mL) + bupivacaine 0.0625% in 0.9% Sodium Chloride PCEA 250 milliLiter(s) Epidural PCA Continuous  lactated ringers. 500 milliLiter(s) (30 mL/Hr) IV Continuous <Continuous>  lidocaine   4% Patch 1 Patch Transdermal daily  montelukast 10 milliGRAM(s) Oral at bedtime  multivitamin 1 Tablet(s) Oral daily  pantoprazole    Tablet 40 milliGRAM(s) Oral before breakfast  piperacillin/tazobactam IVPB.. 3.375 Gram(s) IV Intermittent every 8 hours  polyethylene glycol 3350 17 Gram(s) Oral daily  senna 2 Tablet(s) Oral at bedtime  sodium chloride 3%  Inhalation 4 milliLiter(s) Inhalation every 6 hours  tamsulosin 0.4 milliGRAM(s) Oral at bedtime  tiotropium 2.5 MICROgram(s) Inhaler 2 Puff(s) Inhalation daily    MEDICATIONS  (PRN):  albuterol    90 MICROgram(s) HFA Inhaler 2 Puff(s) Inhalation every 6 hours PRN for shortness of breath and/or wheezing  cetirizine 10 milliGRAM(s) Oral daily PRN for allergy symptoms  diphenhydrAMINE Injectable 25 milliGRAM(s) IV Push every 4 hours PRN Pruritus  HYDROmorphone  Injectable 0.5 milliGRAM(s) IV Push every 3 hours PRN Severe Pain (7 - 10)  hydromorphone (10 MICROgram(s)/mL) + bupivacaine 0.0625% in 0.9% Sodium Chloride PCEA Rescue Clinician  Bolus 5 milliLiter(s) Epidural every 15 minutes PRN for Pain Score greater than 6  naloxone Injectable 0.1 milliGRAM(s) IV Push every 3 minutes PRN For ANY of the following changes in patient status:  A. RR LESS THAN 10 breaths per minute, B. Oxygen saturation LESS THAN 90%, C. Sedation score of 6  ondansetron Injectable 4 milliGRAM(s) IV Push every 6 hours PRN Nausea      OBJECTIVE: Patient sitting up in chair. CTX1 in place.    Assessment of Catheter Site:	[ ] Left	[ ] Right  [x ] Epidural 	[ ] Femoral	      [ ] Saphenous   [ ] Supraclavicular   [ ] Other:    [x ] Dressing intact	[x ] Site non-tender	[ x] Site without erythema, discharge, edema  [x ] Epidural tubing and connection checked	[x] Gross neurological exam within normal limits  [ ] Catheter removed – tip intact		[ ] Afebrile  	[ ] Febrile: ___   [ X] see Temp under VS below)    PT/INR - ( 23 Oct 2024 05:00 )   PT: 12.5 sec;   INR: 1.05 ratio         PTT - ( 23 Oct 2024 05:00 )  PTT:30.0 sec                      12.4   11.32 )-----------( 298      ( 23 Oct 2024 02:45 )             36.5     Vital Signs Last 24 Hrs  T(C): 36.9 (10-23-24 @ 08:00), Max: 36.9 (10-23-24 @ 04:00)  T(F): 98.5 (10-23-24 @ 08:00), Max: 98.5 (10-23-24 @ 08:00)  HR: 75 (10-23-24 @ 09:29) (70 - 101)  BP: 136/55 (10-23-24 @ 09:00) (111/52 - 150/62)  BP(mean): 76 (10-23-24 @ 09:00) (70 - 86)  RR: 22 (10-23-24 @ 09:00) (12 - 22)  SpO2: 99% (10-23-24 @ 09:29) (91% - 100%)      Sedation Score:	[x ] Alert	[ ] Drowsy	[ ] Arousable	[ ] Asleep	[ ] Unresponsive    Side Effects:	[x ] None	[ ] Nausea	[ ] Vomiting	[ ] Pruritus  		[ ] Weakness		[ ] Numbness	[ ] Other:    ASSESSMENT/ PLAN:    Therapy to  be:	[x ] Continue   [ ] Discontinued   [ ] Change to prn Analgesics    Documentation and Verification of current medications:  [ X ] Done	[ ] Not done, not eligible, reason:    Comments: Doing OK with epidural and may continue.    Progress Note written now but Patient was seen earlier.
BENJAMIN POOLE      79y   Male   [id number]         contrast media (iodine-based) (Rash)             Daily Height in cm: 177.8 (21 Oct 2024 06:07)    Daily Weight in k.7 (21 Oct 2024 11:15)Drug Dosing Weight  Height (cm): 177.8 (21 Oct 2024 06:07)  Weight (kg): 81.6 (21 Oct 2024 06:07)  BMI (kg/m2): 25.8 (21 Oct 2024 06:07)  BSA (m2): 2 (21 Oct 2024 06:07)    HPI:  Pt. is a 80 yo male with recurrent pneumonia.  Last hospitalization was 2024.  Pt. was evaluated by his new Pulmonologist.  The findings were indicative of needing a CT scan.  The results showed pulmonary sequestration.     (15 Oct 2024 13:41)      CHIEF COMPLAINT: Follow up in ICU  for postoperative care of patient who is s/p lung surgery    Procedure:  Bronchoscopy, Right thoracotomy with  Right Lower Lobectomy / Resection of pulmonary sequestration 21-Oct-2024                       Issues:              Pulmonary sequestration              Postop pain              Chest tube in place              At risk for respiratory complications              At risk for PCA infusion related complications  Atrial fibrillation  HTN (hypertension)  Seasonal allergies  Bronchiectasis  Recurrent pneumonia  GERD (gastroesophageal reflux disease)  Enlarged prostate  Chronic cough  Emphysema/COPD  Asthma  Hearing loss      Postop course:     Patient reports moderate pain at chest wall incision sites which is worse with coughing and deep breathing without associated fever or dyspnea. Pain is improved with use of PCA and  oral pain meds.         Home Medications:  Albuterol (Eqv-ProAir HFA) 90 mcg/inh inhalation aerosol: 2 puff(s) inhaled every 6 hours as needed for  shortness of breath and/or wheezing (21 Oct 2024 08:25)  atorvastatin 10 mg oral tablet: 1 tab(s) orally once a day (21 Oct 2024 08:25)  azelastine nasal: 2 spray(s) nasal 2 times a day (21 Oct 2024 08:25)  digoxin 125 mcg (0.125 mg) oral tablet: 2 tab(s) orally once a day (21 Oct 2024 08:25)  DilTIAZem (Eqv-Tiazac) 360 mg/24 hours oral capsule, extended release: 1 cap(s) orally once a day (21 Oct 2024 08:25)  dofetilide 500 mcg oral capsule: 1 cap(s) orally 2 times a day (21 Oct 2024 08:25)  Eliquis 5 mg oral tablet: 1 tab(s) orally 2 times a day last dose will be 10/18 (21 Oct 2024 08:25)  fluticasone 50 mcg/inh nasal spray: 2 spray(s) in each nostril 2 times a day (21 Oct 2024 08:25)  ipratropium nasal: 2 spray(s) nasal 2 times a day (21 Oct 2024 08:25)  montelukast 10 mg oral tablet: 1 tab(s) orally once a day (at bedtime) (21 Oct 2024 08:25)  Multiple Vitamins oral tablet: 1 tab(s) orally once a day (21 Oct 2024 08:25)  omeprazole 20 mg oral delayed release capsule: 1 cap(s) orally once a day (21 Oct 2024 08:25)  tamsulosin 0.4 mg oral capsule: 1 cap(s) orally once a day (at bedtime) (21 Oct 2024 08:25)  Trelegy Ellipta 100 mcg-62.5 mcg-25 mcg/inh inhalation powder: 1 puff(s) inhaled once a day (21 Oct 2024 08:25)  ZyrTEC 10 mg oral tablet: 1 tab(s) orally once a day as needed for  allergy symptoms (21 Oct 2024 08:25)    PAST MEDICAL & SURGICAL HISTORY:  Atrial fibrillation      HTN (hypertension)      Seasonal allergies      Bronchiectasis      Recurrent pneumonia      GERD (gastroesophageal reflux disease)      Enlarged prostate      Left cataract      Pulmonary sequestration      Chronic cough      Emphysema/COPD      Asthma      Hearing loss      S/P T&A (status post tonsillectomy and adenoidectomy)      History of appendectomy      H/O cataract extraction        Vital Signs Last 24 Hrs  T(C): 34.7 (21 Oct 2024 12:00), Max: 36.5 (21 Oct 2024 06:07)  T(F): 94.4 (21 Oct 2024 12:00), Max: 97.7 (21 Oct 2024 06:07)  HR: 73 (21 Oct 2024 14:00) (57 - 78)  BP: 162/54 (21 Oct 2024 06:07) (162/54 - 162/54)  BP(mean): --  RR: 16 (21 Oct 2024 14:00) (15 - 24)  SpO2: 94% (21 Oct 2024 14:00) (94% - 97%)    Parameters below as of 21 Oct 2024 14:00  Patient On (Oxygen Delivery Method): nasal cannula w/ humidification  O2 Flow (L/min): 2    I&O's Detail    21 Oct 2024 07:01  -  21 Oct 2024 15:20  --------------------------------------------------------  IN:    Lactated Ringers: 90 mL  Total IN: 90 mL    OUT:    Chest Tube (mL): 190 mL    Indwelling Catheter - Urethral (mL): 135 mL  Total OUT: 325 mL    Total NET: -235 mL        CAPILLARY BLOOD GLUCOSE        Home Medications:  Albuterol (Eqv-ProAir HFA) 90 mcg/inh inhalation aerosol: 2 puff(s) inhaled every 6 hours as needed for  shortness of breath and/or wheezing (21 Oct 2024 08:25)  atorvastatin 10 mg oral tablet: 1 tab(s) orally once a day (21 Oct 2024 08:25)  azelastine nasal: 2 spray(s) nasal 2 times a day (21 Oct 2024 08:25)  digoxin 125 mcg (0.125 mg) oral tablet: 2 tab(s) orally once a day (21 Oct 2024 08:25)  DilTIAZem (Eqv-Tiazac) 360 mg/24 hours oral capsule, extended release: 1 cap(s) orally once a day (21 Oct 2024 08:25)  dofetilide 500 mcg oral capsule: 1 cap(s) orally 2 times a day (21 Oct 2024 08:25)  Eliquis 5 mg oral tablet: 1 tab(s) orally 2 times a day last dose will be 10/18 (21 Oct 2024 08:25)  fluticasone 50 mcg/inh nasal spray: 2 spray(s) in each nostril 2 times a day (21 Oct 2024 08:25)  ipratropium nasal: 2 spray(s) nasal 2 times a day (21 Oct 2024 08:25)  montelukast 10 mg oral tablet: 1 tab(s) orally once a day (at bedtime) (21 Oct 2024 08:25)  Multiple Vitamins oral tablet: 1 tab(s) orally once a day (21 Oct 2024 08:25)  omeprazole 20 mg oral delayed release capsule: 1 cap(s) orally once a day (21 Oct 2024 08:25)  tamsulosin 0.4 mg oral capsule: 1 cap(s) orally once a day (at bedtime) (21 Oct 2024 08:25)  Trelegy Ellipta 100 mcg-62.5 mcg-25 mcg/inh inhalation powder: 1 puff(s) inhaled once a day (21 Oct 2024 08:25)  ZyrTEC 10 mg oral tablet: 1 tab(s) orally once a day as needed for  allergy symptoms (21 Oct 2024 08:25)    MEDICATIONS  (STANDING):  atorvastatin 10 milliGRAM(s) Oral at bedtime  ceFAZolin   IVPB 2000 milliGRAM(s) IV Intermittent every 8 hours  chlorhexidine 2% Cloths 1 Application(s) Topical daily  digoxin     Tablet 250 MICROGram(s) Oral daily  dornase brice Solution 2.5 milliGRAM(s) Inhalation daily  fluticasone propionate 50 MICROgram(s)/spray Nasal Spray 2 Spray(s) Both Nostrils two times a day  fluticasone propionate/ salmeterol 100-50 MICROgram(s) Diskus 1 Dose(s) Inhalation two times a day  heparin   Injectable 5000 Unit(s) SubCutaneous every 8 hours  hydromorphone (10 MICROgram(s)/mL) + bupivacaine 0.0625% in 0.9% Sodium Chloride PCEA 250 milliLiter(s) Epidural PCA Continuous  lactated ringers. 500 milliLiter(s) (30 mL/Hr) IV Continuous <Continuous>  montelukast 10 milliGRAM(s) Oral at bedtime  multivitamin 1 Tablet(s) Oral daily  pantoprazole    Tablet 40 milliGRAM(s) Oral before breakfast  polyethylene glycol 3350 17 Gram(s) Oral daily  senna 2 Tablet(s) Oral at bedtime  sodium chloride 3%  Inhalation 4 milliLiter(s) Inhalation every 6 hours  tamsulosin 0.4 milliGRAM(s) Oral at bedtime  tiotropium 2.5 MICROgram(s) Inhaler 2 Puff(s) Inhalation daily    MEDICATIONS  (PRN):  albuterol    90 MICROgram(s) HFA Inhaler 2 Puff(s) Inhalation every 6 hours PRN for shortness of breath and/or wheezing  cetirizine 10 milliGRAM(s) Oral daily PRN for allergy symptoms  diphenhydrAMINE Injectable 25 milliGRAM(s) IV Push every 4 hours PRN Pruritus  HYDROmorphone  Injectable 0.5 milliGRAM(s) IV Push every 3 hours PRN Severe Pain (7 - 10)  hydromorphone (10 MICROgram(s)/mL) + bupivacaine 0.0625% in 0.9% Sodium Chloride PCEA Rescue Clinician  Bolus 5 milliLiter(s) Epidural every 15 minutes PRN for Pain Score greater than 6  naloxone Injectable 0.1 milliGRAM(s) IV Push every 3 minutes PRN For ANY of the following changes in patient status:  A. RR LESS THAN 10 breaths per minute, B. Oxygen saturation LESS THAN 90%, C. Sedation score of 6  ondansetron Injectable 4 milliGRAM(s) IV Push every 6 hours PRN Nausea          Physical exam:                             General:               Pt is awake, alert,  appears to be in pain but not in distress                                                  Neuro:                  Nonfocal                             Psych:                   A&Ox3                          Cardiovascular:   S1 & S2, regular                           Respiratory:         Air entry is fair and equal on both sides, has bilateral conducted sounds                           GI:                          Soft, nondistended and nontender, Bowel sounds active                            Ext:                        No cyanosis or edema     Labs:                                                                 CXR:  Postop changes, right chest tube in place, lungs are clear    Plan:  General: 79yMale s/p Bronchoscopy, Right thoracotomy with  Right Lower Lobectomy / Resection of pulmonary sequestration 21-Oct-2024 , experiencing  pain with deep breathing.                             Neuro:                                         Pain control with Oxycodone / PCA / PCEA /  Tylenol PRN / Lidopatch                            Cardiovascular:                                          Telemetry (medical test) - Reviewed by me today independently. Pt is in normal sinus rhythm /  with some PVCs / A-fib.                                          HLD: Continue Lipitor                                          HTN: Continue hemodynamic monitoring and restart Cardizem 60mg q8hrs and increase as tolerated     A-fib / Paroxysmal: Continue Digoxin and Cardizem. Hold Eliquis until chest tube is removed.                                         Continue hemodynamic monitoring to prevent decompensation.                            Respiratory:                                         Postop hypoxemia requiring O2 via nasal cannula probably due to postop pain - Wean nasal cannula for goal O2sat above 92%.                                              CXR is clear. Encourage incentive spirometry.                                                   Chest PT and frequent suctioning.                                          COPD: Continue bronchodilators, inhaled steroids, Pulmozyme and inhaled 3% saline inhalations.                                         OOB to chair & ambulate w/ assistance.                                          Continuous pulse oximetry for support & to prevent decompensation.                                         Monitor chest tube output                                         Chest tube to suction                                                                                             GI                                         On puree diet, advance to DASH  diet as tolerated                                         No indication for G.I prophylaxis                                          Continue Zofran / Reglan for nausea - PRN                                         Continue bowel regimen	                                                                 Renal:                                         Continue LR  30cc/hr                                         Monitor I/Os and electrolytes     BPH: Continue Flomax                                                                                        Hem/ Onc:                                         DVT prophylaxis with SQ Heparin and SCDs                                         Monitor chest tube output &  signs of bleeding.                                          Follow CBC in AM                           Infectious disease:      On Cefazolin for postop prophylaxis                                          Monitor for fever / leukocytosis.                                          All surgical incision / chest tube  sites look clean                            Endocrine                                             Continue Accu-Checks with coverage                                                Pertinent clinical, laboratory, radiographic, hemodynamic, echocardiographic, respiratory data, microbiologic data and chart were reviewed and analyzed frequently throughout the course of the day and night. Patient seen, examined and plan discussed with CT Surgeon Dr. Sanchez / CTICU team during rounds.  OOB to chair and ambulate with physical therapy as tolerated.   Status discussed with patient and updated plan of care.   I have spent 50 minutes with this patient  monitoring hemodynamic status, respiratory status and  coordinating care in the ICU.          Steven Rosario MD                                                                    
ANESTHESIA POST-OP CHECK    79y Male POSTOP DAY 1     Vital Signs Last 24 Hrs  T(C): 36.4 (22 Oct 2024 08:00), Max: 37.1 (21 Oct 2024 13:00)  T(F): 97.6 (22 Oct 2024 08:00), Max: 98.7 (21 Oct 2024 13:00)  HR: 87 (22 Oct 2024 11:00) (60 - 89)  BP: 130/60 (22 Oct 2024 11:00) (116/56 - 138/58)  BP(mean): 81 (22 Oct 2024 11:00) (68 - 103)  RR: 20 (22 Oct 2024 11:00) (11 - 24)  SpO2: 95% (22 Oct 2024 11:00) (93% - 100%)    Parameters below as of 22 Oct 2024 11:00  Patient On (Oxygen Delivery Method): nasal cannula  O2 Flow (L/min): 1    I&O's Summary    21 Oct 2024 07:01  -  22 Oct 2024 07:00  --------------------------------------------------------  IN: 1770 mL / OUT: 1560 mL / NET: 210 mL    22 Oct 2024 07:01  -  22 Oct 2024 11:52  --------------------------------------------------------  IN: 390 mL / OUT: 225 mL / NET: 165 mL        [ X ] NO APPARENT ANESTHESIA COMPLICATIONS      Comments: Patient seen at bedside. Pain well controlled on current regimen.
ANESTHESIA POSTOP CHECK    79y Male POSTOP DAY 1     No COMPLAINTS    NO APPARENT ANESTHESIA COMPLICATIONS      
Anesthesia Pain Management Service: Day _4_ of Epidural    SUBJECTIVE: Patient doing well with PCEA and no problems.  Pain Scale Score:   Refer to charted pain scores    THERAPY:  [x ] Epidural Bupivacaine 0.0625% and Hydromorphone  		[X ] 10 micrograms/mL	[ ] 5 micrograms/mL  [ ] Epidural Bupivacaine 0.0625% and Fentanyl - 2 micrograms/mL  [ ] Epidural Ropivacaine 0.1% plain – 1 mg/mL  [ ] Patient Controlled Regional Anesthesia (PCRA) Ropivacaine  		[ ] 0.2%			[ ] 0.1%    Demand dose __3_ lockout __15_ (minutes) Continuous Rate __6_ Total: _143.9__ Daily      MEDICATIONS  (STANDING):  acetaminophen   IVPB .. 1000 milliGRAM(s) IV Intermittent once  apixaban 5 milliGRAM(s) Oral every 12 hours  atorvastatin 10 milliGRAM(s) Oral at bedtime  chlorhexidine 2% Cloths 1 Application(s) Topical daily  digoxin     Tablet 125 MICROGram(s) Oral daily  diltiazem    Tablet 60 milliGRAM(s) Oral every 8 hours  dofetilide. 500 MICROGram(s) Oral every 12 hours  dornase brice Solution 2.5 milliGRAM(s) Inhalation daily  fluticasone propionate 50 MICROgram(s)/spray Nasal Spray 2 Spray(s) Both Nostrils two times a day  fluticasone propionate/ salmeterol 100-50 MICROgram(s) Diskus 1 Dose(s) Inhalation two times a day  lidocaine   4% Patch 1 Patch Transdermal daily  montelukast 10 milliGRAM(s) Oral at bedtime  multivitamin 1 Tablet(s) Oral daily  pantoprazole    Tablet 40 milliGRAM(s) Oral before breakfast  piperacillin/tazobactam IVPB.. 3.375 Gram(s) IV Intermittent every 8 hours  polyethylene glycol 3350 17 Gram(s) Oral daily  senna 2 Tablet(s) Oral at bedtime  sodium chloride 3%  Inhalation 4 milliLiter(s) Inhalation every 6 hours  tamsulosin 0.4 milliGRAM(s) Oral at bedtime  tiotropium 2.5 MICROgram(s) Inhaler 2 Puff(s) Inhalation daily    MEDICATIONS  (PRN):  albuterol    90 MICROgram(s) HFA Inhaler 2 Puff(s) Inhalation every 6 hours PRN for shortness of breath and/or wheezing  cetirizine 10 milliGRAM(s) Oral daily PRN for allergy symptoms  diphenhydrAMINE Injectable 25 milliGRAM(s) IV Push every 4 hours PRN Pruritus  HYDROmorphone  Injectable 0.3 milliGRAM(s) IV Push every 4 hours PRN Severe Breakthrough Pain (7 - 10)  naloxone Injectable 0.1 milliGRAM(s) IV Push every 3 minutes PRN For ANY of the following changes in patient status:  A. RR LESS THAN 10 breaths per minute, B. Oxygen saturation LESS THAN 90%, C. Sedation score of 6  ondansetron Injectable 4 milliGRAM(s) IV Push every 6 hours PRN Nausea  oxyCODONE    IR 5 milliGRAM(s) Oral every 3 hours PRN Severe Pain (7 - 10)  oxyCODONE    IR 2.5 milliGRAM(s) Oral every 3 hours PRN Moderate Pain (4 - 6)      OBJECTIVE: Patient sitting in chair.    Assessment of Catheter Site:	[ ] Left	[ ] Right  [x ] Epidural 	[ ] Femoral	      [ ] Saphenous   [ ] Supraclavicular   [ ] Other:    [x ] Dressing intact	[x ] Site non-tender	[ x] Site without erythema, discharge, edema  [x ] Epidural tubing and connection checked	[x] Gross neurological exam within normal limits  [X ] Catheter removed – tip intact		[ ] Afebrile	  [ ] Febrile: ___       [ X] see Temp under VS below)    PT/INR - ( 24 Oct 2024 05:38 )   PT: 11.7 sec;   INR: 1.01 ratio         PTT - ( 24 Oct 2024 05:38 )  PTT:28.4 sec                      11.7   9.90  )-----------( 288      ( 24 Oct 2024 05:38 )             35.2     Vital Signs Last 24 Hrs  T(C): 36.8 (10-24-24 @ 11:25), Max: 37.1 (10-23-24 @ 16:37)  T(F): 98.3 (10-24-24 @ 11:25), Max: 98.7 (10-23-24 @ 16:37)  HR: 83 (10-24-24 @ 11:25) (79 - 120)  BP: 117/50 (10-24-24 @ 11:25) (117/50 - 153/50)  BP(mean): --  RR: 18 (10-24-24 @ 11:25) (18 - 18)  SpO2: 94% (10-24-24 @ 11:25) (94% - 98%)      Sedation Score:	[x ] Alert	[ ] Drowsy	[ ] Arousable	[ ] Asleep	[ ] Unresponsive    Side Effects:	[x ] None	[ ] Nausea	[ ] Vomiting	[ ] Pruritus  		[ ] Weakness		[ ] Numbness	[ ] Other:    ASSESSMENT/ PLAN:    Therapy to  be:	[ ] Continue   [ X] Discontinued   [ X] Change to prn Analgesics    Documentation and Verification of current medications:  [ X ] Done	[ ] Not done, not eligible, reason:    Comments: Discussed with primary team, PCEA discontinued. Changed to IV/oral opioid and/or non-opioid Adjuvant analgesics to be used at this point.    Progress Note written now but Patient was seen earlier.
BENJAMIN POOLE                     [id number]    HPI:  Pt. is a 78 yo male with recurrent pneumonia.  Last hospitalization was 8/2024.  Pt. was evaluated by his new Pulmonologist.  The findings were indicative of needing a CT scan.  The results showed pulmonary sequestration.     (15 Oct 2024 13:41)      CHIEF COMPLAINT: Follow up in ICU  for postoperative care of patient who is s/p lung surgery    Procedure:  Bronchoscopy, Right thoracotomy with  Right Lower Lobectomy / Resection of pulmonary sequestration 21-Oct-2024                       Issues:              Pulmonary sequestration              Postop pain              Chest tube in place              At risk for respiratory complications              At risk for PCA infusion related complications  Atrial fibrillation  HTN (hypertension)  Seasonal allergies  Bronchiectasis  Recurrent pneumonia  GERD (gastroesophageal reflux disease)  Enlarged prostate  Chronic cough  Emphysema/COPD  Asthma  Hearing loss    PAST MEDICAL & SURGICAL HISTORY:  Atrial fibrillation      HTN (hypertension)      Seasonal allergies      Bronchiectasis      Recurrent pneumonia      GERD (gastroesophageal reflux disease)      Enlarged prostate      Left cataract      Pulmonary sequestration      Chronic cough      Emphysema/COPD      Asthma      Hearing loss      S/P T&A (status post tonsillectomy and adenoidectomy)      History of appendectomy      H/O cataract extraction                VITAL SIGNS:  Vital Signs Last 24 Hrs  T(C): 36.1 (22 Oct 2024 12:00), Max: 36.7 (22 Oct 2024 00:00)  T(F): 97 (22 Oct 2024 12:00), Max: 98 (22 Oct 2024 00:00)  HR: 83 (22 Oct 2024 15:00) (74 - 89)  BP: 129/66 (22 Oct 2024 15:00) (111/52 - 138/58)  BP(mean): 85 (22 Oct 2024 15:00) (68 - 103)  RR: 18 (22 Oct 2024 15:00) (11 - 24)  SpO2: 95% (22 Oct 2024 15:00) (91% - 100%)    Parameters below as of 22 Oct 2024 14:00  Patient On (Oxygen Delivery Method): nasal cannula  O2 Flow (L/min): 1      I/Os:   I&O's Detail    21 Oct 2024 07:01  -  22 Oct 2024 07:00  --------------------------------------------------------  IN:    IV PiggyBack: 450 mL    Lactated Ringers: 570 mL    Oral Fluid: 750 mL  Total IN: 1770 mL    OUT:    Chest Tube (mL): 600 mL    Indwelling Catheter - Urethral (mL): 960 mL  Total OUT: 1560 mL    Total NET: 210 mL      22 Oct 2024 07:01  -  22 Oct 2024 15:53  --------------------------------------------------------  IN:    IV PiggyBack: 300 mL    Lactated Ringers: 240 mL    Oral Fluid: 600 mL  Total IN: 1140 mL    OUT:    Chest Tube (mL): 100 mL    Indwelling Catheter - Urethral (mL): 365 mL  Total OUT: 465 mL    Total NET: 675 mL          CAPILLARY BLOOD GLUCOSE          =======================MEDICATIONS===================  MEDICATIONS  (STANDING):  acetaminophen   IVPB .. 1000 milliGRAM(s) IV Intermittent every 6 hours  atorvastatin 10 milliGRAM(s) Oral at bedtime  chlorhexidine 2% Cloths 1 Application(s) Topical daily  digoxin     Tablet 125 MICROGram(s) Oral daily  diltiazem    Tablet 60 milliGRAM(s) Oral every 8 hours  dofetilide. 500 MICROGram(s) Oral every 12 hours  dornase brice Solution 2.5 milliGRAM(s) Inhalation daily  fluticasone propionate 50 MICROgram(s)/spray Nasal Spray 2 Spray(s) Both Nostrils two times a day  fluticasone propionate/ salmeterol 100-50 MICROgram(s) Diskus 1 Dose(s) Inhalation two times a day  heparin   Injectable 5000 Unit(s) SubCutaneous every 8 hours  hydromorphone (10 MICROgram(s)/mL) + bupivacaine 0.0625% in 0.9% Sodium Chloride PCEA 250 milliLiter(s) Epidural PCA Continuous  lactated ringers. 500 milliLiter(s) (30 mL/Hr) IV Continuous <Continuous>  lidocaine   4% Patch 1 Patch Transdermal daily  montelukast 10 milliGRAM(s) Oral at bedtime  multivitamin 1 Tablet(s) Oral daily  pantoprazole    Tablet 40 milliGRAM(s) Oral before breakfast  piperacillin/tazobactam IVPB.. 3.375 Gram(s) IV Intermittent every 8 hours  polyethylene glycol 3350 17 Gram(s) Oral daily  senna 2 Tablet(s) Oral at bedtime  sodium chloride 3%  Inhalation 4 milliLiter(s) Inhalation every 6 hours  tamsulosin 0.4 milliGRAM(s) Oral at bedtime  tiotropium 2.5 MICROgram(s) Inhaler 2 Puff(s) Inhalation daily    MEDICATIONS  (PRN):  albuterol    90 MICROgram(s) HFA Inhaler 2 Puff(s) Inhalation every 6 hours PRN for shortness of breath and/or wheezing  cetirizine 10 milliGRAM(s) Oral daily PRN for allergy symptoms  diphenhydrAMINE Injectable 25 milliGRAM(s) IV Push every 4 hours PRN Pruritus  HYDROmorphone  Injectable 0.5 milliGRAM(s) IV Push every 3 hours PRN Severe Pain (7 - 10)  hydromorphone (10 MICROgram(s)/mL) + bupivacaine 0.0625% in 0.9% Sodium Chloride PCEA Rescue Clinician  Bolus 5 milliLiter(s) Epidural every 15 minutes PRN for Pain Score greater than 6  naloxone Injectable 0.1 milliGRAM(s) IV Push every 3 minutes PRN For ANY of the following changes in patient status:  A. RR LESS THAN 10 breaths per minute, B. Oxygen saturation LESS THAN 90%, C. Sedation score of 6  ondansetron Injectable 4 milliGRAM(s) IV Push every 6 hours PRN Nausea      PHYSICAL EXAM============================  General:                         Awake, alert, not in any distress  Neuro:                            Moving all extremities to commands.   Respiratory:	Air entry fair and  bilateral conducted sounds                                           Effort even and unlabored.  CV:		Regular rate and rhythm. Normal S1/S2                                          Distal pulses present.  Abdomen:	                     Soft, non-distended. Bowel sounds present   Skin:		No rash.  Extremities:	Warm, no cyanosis or edema.  Palpable pulses    ============================LABS=========================                        12.5   13.97 )-----------( 335      ( 22 Oct 2024 03:40 )             37.1     10-22    134[L]  |  101  |  18  ----------------------------<  138[H]  4.3   |  22  |  0.96    Ca    7.7[L]      22 Oct 2024 03:40  Phos  3.7     10-22  Mg     1.90     10-22        PT/INR - ( 22 Oct 2024 03:40 )   PT: 13.3 sec;   INR: 1.15 ratio         PTT - ( 22 Oct 2024 03:40 )  PTT:29.7 sec    Urinalysis Basic - ( 22 Oct 2024 03:40 )    Color: x / Appearance: x / SG: x / pH: x  Gluc: 138 mg/dL / Ketone: x  / Bili: x / Urobili: x   Blood: x / Protein: x / Nitrite: x   Leuk Esterase: x / RBC: x / WBC x   Sq Epi: x / Non Sq Epi: x / Bacteria: x    A/P:  79yMale s/p Bronchoscopy, Right thoracotomy with  Right Lower Lobectomy / Resection of pulmonary sequestration 21-Oct-2024 , experiencing  pain with deep breathing.                             Neuro:                                         Pain control with PCEA/ IV Tylenol PRN / Lidopatch                            Cardiovascular:                                          Telemetry (medical test) - Reviewed by me today independently. Pt is in normal sinus rhythm /  with some PVCs / A-fib.                                          HLD: Continue Lipitor                                          HTN: Continue hemodynamic monitoring and restart Cardizem 60mg q8hrs and increase as tolerated     A-fib / Paroxysmal: Continue Digoxin and Cardizem. Hold Eliquis until chest tube is removed.                                         Continue hemodynamic monitoring to prevent decompensation.                            Respiratory:                                         Postop hypoxemia requiring O2 via nasal cannula probably due to postop pain - Wean nasal cannula for goal O2sat above 92%.                                          Encourage incentive spirometry.                                                   Chest PT and frequent suctioning.                                          COPD: Continue bronchodilators, inhaled steroids, Pulmozyme and inhaled 3% saline inhalations.                                         OOB to chair & ambulate w/ assistance.                                          Continuous pulse oximetry for support & to prevent decompensation.                                         Monitor chest tube output                                         Chest tube to suction                                                                                             GI                                         On  DASH  diet as tolerated                                         No indication for G.I prophylaxis                                          Continue Zofran / Reglan for nausea - PRN                                         Continue bowel regimen	                                                                 Renal:                                         Continue LR  30cc/hr                                         Monitor I/Os and electrolytes     BPH: Continue Flomax                                                                                        Hem/ Onc:                                         DVT prophylaxis with SQ Heparin and SCDs                                         Monitor chest tube output &  signs of bleeding.                                          Follow CBC in AM                           Infectious disease:      On Cefazolin for postop prophylaxis                                          Monitor for fever / leukocytosis.                                          All surgical incision / chest tube  sites look clean                            Endocrine                                             Continue Accu-Checks with coverage                                                Pertinent clinical, laboratory, radiographic, hemodynamic, echocardiographic, respiratory data, microbiologic data and chart were reviewed and analyzed frequently throughout the course of the day and night. Patient seen, examined and plan discussed with CT Surgeon Dr. Sanchez / CTICU team during rounds.  OOB to chair and ambulate with physical therapy as tolerated.   Status discussed with patient and updated plan of care.   I have spent 50 minutes with this patient  monitoring hemodynamic status, respiratory status and  coordinating care in the ICU.    Pradeep Lawrence DO FACEP    
    Subjective: Patient seen and examined. No new events except as noted.     SUBJECTIVE/ROS:  No chest pain, dyspnea, palpitation, or dizziness.       MEDICATIONS:  MEDICATIONS  (STANDING):  atorvastatin 10 milliGRAM(s) Oral at bedtime  chlorhexidine 2% Cloths 1 Application(s) Topical daily  digoxin     Tablet 125 MICROGram(s) Oral daily  diltiazem    Tablet 60 milliGRAM(s) Oral every 8 hours  dofetilide. 500 MICROGram(s) Oral every 12 hours  dornase brice Solution 2.5 milliGRAM(s) Inhalation daily  fluticasone propionate 50 MICROgram(s)/spray Nasal Spray 2 Spray(s) Both Nostrils two times a day  fluticasone propionate/ salmeterol 100-50 MICROgram(s) Diskus 1 Dose(s) Inhalation two times a day  hydromorphone (10 MICROgram(s)/mL) + bupivacaine 0.0625% in 0.9% Sodium Chloride PCEA 250 milliLiter(s) Epidural PCA Continuous  lactated ringers. 500 milliLiter(s) (30 mL/Hr) IV Continuous <Continuous>  lidocaine   4% Patch 1 Patch Transdermal daily  montelukast 10 milliGRAM(s) Oral at bedtime  multivitamin 1 Tablet(s) Oral daily  pantoprazole    Tablet 40 milliGRAM(s) Oral before breakfast  piperacillin/tazobactam IVPB.. 3.375 Gram(s) IV Intermittent every 8 hours  polyethylene glycol 3350 17 Gram(s) Oral daily  senna 2 Tablet(s) Oral at bedtime  sodium chloride 3%  Inhalation 4 milliLiter(s) Inhalation every 6 hours  tamsulosin 0.4 milliGRAM(s) Oral at bedtime  tiotropium 2.5 MICROgram(s) Inhaler 2 Puff(s) Inhalation daily      PHYSICAL EXAM:  T(C): 37.1 (10-24-24 @ 05:36), Max: 37.1 (10-23-24 @ 16:37)  HR: 83 (10-24-24 @ 05:36) (75 - 101)  BP: 144/71 (10-24-24 @ 05:36) (126/50 - 153/50)  RR: 18 (10-24-24 @ 05:36) (18 - 22)  SpO2: 95% (10-24-24 @ 05:36) (91% - 99%)  Wt(kg): --  I&O's Summary    23 Oct 2024 07:01  -  24 Oct 2024 07:00  --------------------------------------------------------  IN: 390 mL / OUT: 1430 mL / NET: -1040 mL            JVP: Normal  Neck: supple  Lung: clear   CV: S1 S2 , Murmur:  Abd: soft  Ext: No edema  neuro: Awake / alert  Psych: flat affect  Skin: normal``    LABS/DATA:    CARDIAC MARKERS:                                12.4   11.32 )-----------( 298      ( 23 Oct 2024 02:45 )             36.5     10-23    132[L]  |  96[L]  |  18  ----------------------------<  132[H]  4.1   |  26  |  1.00    Ca    8.3[L]      23 Oct 2024 02:45  Phos  2.2     10-23  Mg     2.40     10-23      proBNP:   Lipid Profile:   HgA1c:   TSH:     TELE:  EKG:        
CHIEF COMPLAINT: FOLLOW UP IN ICU FOR POSTOPERATIVE CARE OF PATIENT WHO IS S/P RLL lobectomy      PROCEDURES:     Bronchoscopy, Right thoracotomy with  Right Lower Lobectomy / Resection of pulmonary sequestration 21-Oct-2024    ISSUES:     Lung sequestration  Chest tube in place  Postoperative pain  COPD/asthma  Afib  Recurrent pneumonia  GERD  BPH  Hearing loss  HTN    INTERVAL EVENTS:   Chest tube without airleak, placed to water seal  Pain well controlled with epidural in place      HISTORY:   Patient reports moderate pain at chest wall incision sites which is worse with coughing and deep breathing without associated fever or dyspnea. Pain is improved with use of pain meds.     PHYSICAL EXAM:   Gen: Comfortable, No acute distress  Eyes: Sclera white, Conjunctiva normal, Eyelids normal, Pupils symmetrical   ENT: Mucous membranes moist,  ,  ,    Neck: Trachea midline,  ,  ,  ,  ,  ,    CV: Rate regular, Rhythm regular,  ,  ,    Resp: Breath sounds clear, No accessory muscles use, R chest tube in place,  ,    Abd: Soft, Non-distended, Non-tender, Bowel sounds normal,  ,  ,    Skin: Warm, No peripheral edema of lower extremities,  ,    :  rod  Neuro: Moving all 4 extremities,    Psych: A&Ox3      ASSESSMENT AND PLAN:     NEURO:  Post-operative Pain - Stable. Pain control with PCEA and Tylenol IV PRN.           RESPIRATORY:  Hypoxia - Wean nasal cannula for goal O2sat above 92. Obtain CXR. Incentive spirometry. Chest PT and frequent suctioning. Continue bronchodilators. OOB to chair & ambulate w/ assistance. Continuous pulse oximetry for support & to prevent decompensation.    Chest tube – Pleurevac regulated water seal. Monitor chest tube output.     COPD - worsened after surgery, continue bronchodilators             CARDIOVASCULAR:  Hemodynamically stable - Not on pressors. Continue hemodynamic monitoring.  Telemetry (medical test) - Reviewed by me today independently. Normal sinus rhythm.   H/o Afib - in NSR now with PACs, continue Dofetilide, digoxin 125mcg daily. Eliquis to be restarted on 10/25/24, on hold while chest tube in place               RENAL:  Stable - Monitor IOs and electrolytes. Keep K above 4.0 and Mg above 2.0. Continue Flomax.              GASTROINTESTINAL:  GI prophylaxis not indicated  Zofran and Reglan IV PRN for nausea  Regular consistency diet                     HEMATOLOGIC:  No signs of active bleeding. Monitor Hgb in CBC in AM  DVT prophylaxis with heparin subQ and SCDs.               INFECTIOUS DISEASE:  All surgical sites appear clean. No signs of active infection, on zosyn. Will monitor for fever and leukocytosis.   F/u BAL final cultures              ENDOCRINE:  Stable – Monitor glucose fingersticks for goal 120-180.             Pertinent clinical, laboratory, radiographic, hemodynamic, echocardiographic, respiratory data, microbiologic data and chart were reviewed by myself and analyzed frequently throughout the course of the day and night by myself.    Plan discussed at length with the CTICU staff and Attending CT Surgeon -   Dr Toño Sanchez    Patient's status was discussed with patient at bedside.    I have spent 50 minutes of time with this patient monitoring hemodynamic status, respiratory status, and coordinating care in the ICU.    _________________________  VITAL SIGNS:  Vital Signs Last 24 Hrs  T(C): 36.9 (23 Oct 2024 08:00), Max: 36.9 (23 Oct 2024 04:00)  T(F): 98.5 (23 Oct 2024 08:00), Max: 98.5 (23 Oct 2024 08:00)  HR: 75 (23 Oct 2024 09:29) (70 - 101)  BP: 136/55 (23 Oct 2024 09:00) (111/52 - 150/62)  BP(mean): 76 (23 Oct 2024 09:00) (70 - 86)  RR: 22 (23 Oct 2024 09:00) (12 - 22)  SpO2: 99% (23 Oct 2024 09:29) (91% - 100%)    Parameters below as of 23 Oct 2024 09:29  Patient On (Oxygen Delivery Method): nasal cannula w/ humidification      I/Os:   I&O's Detail    22 Oct 2024 07:01  -  23 Oct 2024 07:00  --------------------------------------------------------  IN:    IV PiggyBack: 600 mL    Lactated Ringers: 720 mL    Oral Fluid: 940 mL  Total IN: 2260 mL    OUT:    Chest Tube (mL): 190 mL    Indwelling Catheter - Urethral (mL): 1385 mL  Total OUT: 1575 mL    Total NET: 685 mL      23 Oct 2024 07:01  -  23 Oct 2024 10:25  --------------------------------------------------------  IN:    Lactated Ringers: 60 mL  Total IN: 60 mL    OUT:    Chest Tube (mL): 60 mL    Indwelling Catheter - Urethral (mL): 120 mL  Total OUT: 180 mL    Total NET: -120 mL              MEDICATIONS:  MEDICATIONS  (STANDING):  atorvastatin 10 milliGRAM(s) Oral at bedtime  chlorhexidine 2% Cloths 1 Application(s) Topical daily  digoxin     Tablet 125 MICROGram(s) Oral daily  diltiazem    Tablet 60 milliGRAM(s) Oral every 8 hours  dofetilide. 500 MICROGram(s) Oral every 12 hours  dornase brice Solution 2.5 milliGRAM(s) Inhalation daily  fluticasone propionate 50 MICROgram(s)/spray Nasal Spray 2 Spray(s) Both Nostrils two times a day  fluticasone propionate/ salmeterol 100-50 MICROgram(s) Diskus 1 Dose(s) Inhalation two times a day  heparin   Injectable 5000 Unit(s) SubCutaneous every 8 hours  hydromorphone (10 MICROgram(s)/mL) + bupivacaine 0.0625% in 0.9% Sodium Chloride PCEA 250 milliLiter(s) Epidural PCA Continuous  lactated ringers. 500 milliLiter(s) (30 mL/Hr) IV Continuous <Continuous>  lidocaine   4% Patch 1 Patch Transdermal daily  montelukast 10 milliGRAM(s) Oral at bedtime  multivitamin 1 Tablet(s) Oral daily  pantoprazole    Tablet 40 milliGRAM(s) Oral before breakfast  piperacillin/tazobactam IVPB.. 3.375 Gram(s) IV Intermittent every 8 hours  polyethylene glycol 3350 17 Gram(s) Oral daily  senna 2 Tablet(s) Oral at bedtime  sodium chloride 3%  Inhalation 4 milliLiter(s) Inhalation every 6 hours  tamsulosin 0.4 milliGRAM(s) Oral at bedtime  tiotropium 2.5 MICROgram(s) Inhaler 2 Puff(s) Inhalation daily    MEDICATIONS  (PRN):  albuterol    90 MICROgram(s) HFA Inhaler 2 Puff(s) Inhalation every 6 hours PRN for shortness of breath and/or wheezing  cetirizine 10 milliGRAM(s) Oral daily PRN for allergy symptoms  diphenhydrAMINE Injectable 25 milliGRAM(s) IV Push every 4 hours PRN Pruritus  HYDROmorphone  Injectable 0.5 milliGRAM(s) IV Push every 3 hours PRN Severe Pain (7 - 10)  hydromorphone (10 MICROgram(s)/mL) + bupivacaine 0.0625% in 0.9% Sodium Chloride PCEA Rescue Clinician  Bolus 5 milliLiter(s) Epidural every 15 minutes PRN for Pain Score greater than 6  naloxone Injectable 0.1 milliGRAM(s) IV Push every 3 minutes PRN For ANY of the following changes in patient status:  A. RR LESS THAN 10 breaths per minute, B. Oxygen saturation LESS THAN 90%, C. Sedation score of 6  ondansetron Injectable 4 milliGRAM(s) IV Push every 6 hours PRN Nausea      LABS:  Laboratory data was independently reviewed by me today.                           12.4   11.32 )-----------( 298      ( 23 Oct 2024 02:45 )             36.5     10-23    132[L]  |  96[L]  |  18  ----------------------------<  132[H]  4.1   |  26  |  1.00    Ca    8.3[L]      23 Oct 2024 02:45  Phos  2.2     10-23  Mg     2.40     10-23        PT/INR - ( 23 Oct 2024 05:00 )   PT: 12.5 sec;   INR: 1.05 ratio         PTT - ( 23 Oct 2024 05:00 )  PTT:30.0 sec    Urinalysis Basic - ( 23 Oct 2024 02:45 )    Color: x / Appearance: x / SG: x / pH: x  Gluc: 132 mg/dL / Ketone: x  / Bili: x / Urobili: x   Blood: x / Protein: x / Nitrite: x   Leuk Esterase: x / RBC: x / WBC x   Sq Epi: x / Non Sq Epi: x / Bacteria: x        RADIOLOGY:   Radiology images were independently reviewed by me today. Reports were reviewed by me today.    Xray Chest 1 View- PORTABLE-Routine:   ACC: [id number] EXAM:  XR CHEST PORTABLE ROUTINE 1V   ORDERED BY: JASPREET BLACKHTA     PROCEDURE DATE:  10/22/2024          INTERPRETATION:  CLINICAL INFORMATION: Follow-up post right thoracotomy    TIME OF EXAMINATION: October 22 at 5:06 AM    EXAM: Portable chest    FINDINGS:    Right-sided chest tube unchanged.  Lungs are clear.  Trace right effusion but no pneumothorax.  Heart size is stable.        COMPARISON: October 21        IMPRESSION: Status post right thoracotomy with chest tube and clearlungs.    --- End of Report ---            BENJAMIN NELSON MD; Attending Radiologist  This document has been electronically signed. Oct 22 2024 10:44AM (10-22-24 @ 06:16)  Xray Chest 1 View- PORTABLE-Urgent:   ACC: [id number] EXAM:  XR CHEST PORTABLE URGENT 1V   ORDERED BY: JASPREET BLACKHTA     PROCEDURE DATE:  10/21/2024          INTERPRETATION:  INDICATION: Status post resection of right lower lobe   pulmonary sequestration    Portable chest    COMPARISON: None    FINDINGS:  Heart/Vascular: The heart size, mediastinum, hilum and aorta are within   normal limits for projection.  Pulmonary: Midline trachea. There is a trace right apical pneumothorax.   Right chest tube. No pulmonary congestion. Minimal bibasilar atelectasis.   Minimal subcutaneous emphysema along the right upper chest wall and neck  Bones: There is no fracture.      Impression:    Status post resection of right lower lobe pulmonary sequestration    There is a trace right apical pneumothorax.    --- End of Report ---            CALDERON SCHMITZ DO; Attending Radiologist  This document has been electronically signed. Oct 21 2024  1:06PM (10-21-24 @ 13:00)

## 2024-10-24 NOTE — PROGRESS NOTE ADULT - ASSESSMENT
PAF  if afib now      on Dofetilide   on cardizem   on dig    Ep consult to consider [facility]     resume a/c once deemed safe as per CTS  avoid qt prolonging drugs   keep K, Mg wnl     
PAF  in sinus   cont Dofetilide   cont cardizem   cont dig  resume a/c once deemed safe as per CTS  avoid qt prolonging drugs   keep K, Mg wnl      Advanced care planning was discussed with patient and family.  Risks, benefits and alternatives of the cardiac treatments and medical therapy including procedures were discussed in detail and all questions were answered. Importance of compliance with medical therapy and lifestyle modification to improve cardiovascular health were addressed. Appropriate forms and patient educational materials were reviewed. 30 minutes face to face spent.

## 2024-10-24 NOTE — DISCHARGE NOTE NURSING/CASE MANAGEMENT/SOCIAL WORK - PATIENT PORTAL LINK FT
You can access the FollowMyHealth Patient Portal offered by [facility] by registering at the following website: [email or web address]. By joining [facility]’s FollowMyHealth portal, you will also be able to view your health information using other applications (apps) compatible with our system.

## 2024-10-25 LAB
-  AZTREONAM: SIGNIFICANT CHANGE UP
-  CEFEPIME: SIGNIFICANT CHANGE UP
-  CEFTAZIDIME: SIGNIFICANT CHANGE UP
-  CIPROFLOXACIN: SIGNIFICANT CHANGE UP
-  IMIPENEM: SIGNIFICANT CHANGE UP
-  LEVOFLOXACIN: SIGNIFICANT CHANGE UP
-  MEROPENEM: SIGNIFICANT CHANGE UP
-  PIPERACILLIN/TAZOBACTAM: SIGNIFICANT CHANGE UP
CULTURE RESULTS: ABNORMAL
METHOD TYPE: SIGNIFICANT CHANGE UP
ORGANISM # SPEC MICROSCOPIC CNT: ABNORMAL
ORGANISM # SPEC MICROSCOPIC CNT: ABNORMAL
SPECIMEN SOURCE: SIGNIFICANT CHANGE UP

## 2024-10-29 RX ORDER — ACETAMINOPHEN 500 MG
2 TABLET ORAL
Qty: 0 | Refills: 0 | DISCHARGE

## 2024-10-29 RX ORDER — ATORVASTATIN CALCIUM 10 MG/1
1 TABLET, FILM COATED ORAL
Refills: 0 | DISCHARGE

## 2024-10-29 RX ORDER — AZELASTINE HYDROCHLORIDE 205.5 UG/1
2 SPRAY, METERED NASAL
Refills: 0 | DISCHARGE

## 2024-10-29 RX ORDER — MULTI VITAMIN/MINERAL SUPPLEMENT WITH ASCORBIC ACID, NIACIN, PYRIDOXINE, PANTOTHENIC ACID, FOLIC ACID, RIBOFLAVIN, THIAMIN, BIOTIN, COBALAMIN AND ZINC. 60; 20; 12.5; 10; 10; 1.7; 1.5; 1; .3; .006 MG/1; MG/1; MG/1; MG/1; MG/1; MG/1; MG/1; MG/1; MG/1; MG/1
1 TABLET, COATED ORAL
Refills: 0 | DISCHARGE

## 2024-10-29 RX ORDER — DOFETILIDE 0.12 MG/1
1 CAPSULE ORAL
Refills: 0 | DISCHARGE

## 2024-10-29 RX ORDER — MONTELUKAST SODIUM 10 MG/1
1 TABLET, FILM COATED ORAL
Refills: 0 | DISCHARGE

## 2024-10-29 RX ORDER — FLUTICASONE PROPIONATE 50 UG/1
2 SPRAY, METERED NASAL
Refills: 0 | DISCHARGE

## 2024-10-29 RX ORDER — FLUTICASONE FUROATE, UMECLIDINIUM BROMIDE AND VILANTEROL TRIFENATATE 100; 62.5; 25 UG/1; UG/1; UG/1
1 POWDER RESPIRATORY (INHALATION)
Refills: 0 | DISCHARGE

## 2024-10-29 RX ORDER — CETIRIZINE HYDROCHLORIDE 10 MG/1
1 TABLET ORAL
Refills: 0 | DISCHARGE

## 2024-10-29 RX ORDER — TAMSULOSIN HCL 0.4 MG
1 CAPSULE ORAL
Refills: 0 | DISCHARGE

## 2024-10-29 RX ORDER — DILTIAZEM HCL 300 MG
1 CAPSULE, EXTENDED RELEASE 24HR ORAL
Refills: 0 | DISCHARGE

## 2024-10-29 RX ORDER — ALBUTEROL 90 MCG
2 AEROSOL (GRAM) INHALATION
Refills: 0 | DISCHARGE

## 2024-10-29 RX ORDER — APIXABAN 5 MG/1
1 TABLET, FILM COATED ORAL
Refills: 0 | DISCHARGE

## 2024-10-29 RX ORDER — IPRATROPIUM BROMIDE 42 UG/1
2 SPRAY, METERED NASAL
Refills: 0 | DISCHARGE

## 2024-10-30 PROBLEM — H91.90 UNSPECIFIED HEARING LOSS, UNSPECIFIED EAR: Chronic | Status: ACTIVE | Noted: 2024-10-15

## 2024-10-30 PROBLEM — K21.9 GASTRO-ESOPHAGEAL REFLUX DISEASE WITHOUT ESOPHAGITIS: Chronic | Status: ACTIVE | Noted: 2024-10-15

## 2024-10-30 PROBLEM — I48.91 UNSPECIFIED ATRIAL FIBRILLATION: Chronic | Status: ACTIVE | Noted: 2024-10-15

## 2024-10-30 PROBLEM — H26.9 UNSPECIFIED CATARACT: Chronic | Status: ACTIVE | Noted: 2024-10-15

## 2024-10-30 PROBLEM — J43.9 EMPHYSEMA, UNSPECIFIED: Chronic | Status: ACTIVE | Noted: 2024-10-15

## 2024-10-30 PROBLEM — I10 ESSENTIAL (PRIMARY) HYPERTENSION: Chronic | Status: ACTIVE | Noted: 2024-10-15

## 2024-10-30 PROBLEM — J45.909 UNSPECIFIED ASTHMA, UNCOMPLICATED: Chronic | Status: ACTIVE | Noted: 2024-10-15

## 2024-10-30 PROBLEM — R05.3 CHRONIC COUGH: Chronic | Status: ACTIVE | Noted: 2024-10-15

## 2024-10-30 PROBLEM — N40.0 BENIGN PROSTATIC HYPERPLASIA WITHOUT LOWER URINARY TRACT SYMPTOMS: Chronic | Status: ACTIVE | Noted: 2024-10-15

## 2024-10-30 PROBLEM — Q33.2 SEQUESTRATION OF LUNG: Chronic | Status: ACTIVE | Noted: 2024-10-15

## 2024-10-30 PROBLEM — J30.2 OTHER SEASONAL ALLERGIC RHINITIS: Chronic | Status: ACTIVE | Noted: 2024-10-15

## 2024-10-30 PROBLEM — J18.9 PNEUMONIA, UNSPECIFIED ORGANISM: Chronic | Status: ACTIVE | Noted: 2024-10-15

## 2024-10-30 PROBLEM — J47.9 BRONCHIECTASIS, UNCOMPLICATED: Chronic | Status: ACTIVE | Noted: 2024-10-15

## 2024-11-13 ENCOUNTER — APPOINTMENT (OUTPATIENT)
Dept: THORACIC SURGERY | Facility: CLINIC | Age: 79
End: 2024-11-13
Payer: MEDICARE

## 2024-11-13 ENCOUNTER — NON-APPOINTMENT (OUTPATIENT)
Age: 79
End: 2024-11-13

## 2024-11-13 VITALS
HEART RATE: 68 BPM | RESPIRATION RATE: 14 BRPM | SYSTOLIC BLOOD PRESSURE: 144 MMHG | OXYGEN SATURATION: 98 % | HEIGHT: 72 IN | DIASTOLIC BLOOD PRESSURE: 67 MMHG | WEIGHT: 170 LBS | BODY MASS INDEX: 23.03 KG/M2

## 2024-11-13 DIAGNOSIS — Q33.2 SEQUESTRATION OF LUNG: ICD-10-CM

## 2024-11-13 PROCEDURE — 99024 POSTOP FOLLOW-UP VISIT: CPT

## 2025-01-15 ENCOUNTER — APPOINTMENT (OUTPATIENT)
Dept: THORACIC SURGERY | Facility: CLINIC | Age: 80
End: 2025-01-15
Payer: MEDICARE

## 2025-01-15 ENCOUNTER — NON-APPOINTMENT (OUTPATIENT)
Age: 80
End: 2025-01-15

## 2025-01-15 VITALS
SYSTOLIC BLOOD PRESSURE: 155 MMHG | WEIGHT: 170 LBS | RESPIRATION RATE: 14 BRPM | DIASTOLIC BLOOD PRESSURE: 55 MMHG | OXYGEN SATURATION: 98 % | HEART RATE: 66 BPM | HEIGHT: 72 IN | BODY MASS INDEX: 23.03 KG/M2

## 2025-01-15 DIAGNOSIS — J18.1 LOBAR PNEUMONIA, UNSPECIFIED ORGANISM: ICD-10-CM

## 2025-01-15 DIAGNOSIS — Q33.2 SEQUESTRATION OF LUNG: ICD-10-CM

## 2025-01-15 PROCEDURE — 99024 POSTOP FOLLOW-UP VISIT: CPT

## 2025-01-24 PROBLEM — R91.8 LUNG NODULES: Status: ACTIVE | Noted: 2025-01-24

## 2025-01-29 ENCOUNTER — APPOINTMENT (OUTPATIENT)
Dept: THORACIC SURGERY | Facility: CLINIC | Age: 80
End: 2025-01-29
Payer: MEDICARE

## 2025-01-29 DIAGNOSIS — R91.8 OTHER NONSPECIFIC ABNORMAL FINDING OF LUNG FIELD: ICD-10-CM

## 2025-01-29 DIAGNOSIS — J18.1 LOBAR PNEUMONIA, UNSPECIFIED ORGANISM: ICD-10-CM

## 2025-01-29 DIAGNOSIS — Q33.2 SEQUESTRATION OF LUNG: ICD-10-CM

## 2025-01-29 PROCEDURE — 99213 OFFICE O/P EST LOW 20 MIN: CPT | Mod: 93

## (undated) DEVICE — DRAPE INSTRUMENT POUCH 6.75" X 11"

## (undated) DEVICE — SOL INJ LR 1000ML

## (undated) DEVICE — STAPLER ETHICON ECHELON FLEX 45MM X 340MM

## (undated) DEVICE — SYR LUER LOK 10CC

## (undated) DEVICE — ELCTR BOVIE TIP BLADE INSULATED 2.75" EDGE

## (undated) DEVICE — SOL ANTI FOG

## (undated) DEVICE — SUT VICRYL PLUS 2 27" TP-1 UNDYED

## (undated) DEVICE — VISITEC 4X4

## (undated) DEVICE — SUT MONOCRYL 4-0 27" PS-2 UNDYED

## (undated) DEVICE — SUT PDS II 2-0 27" SH

## (undated) DEVICE — DRSG CURITY GAUZE SPONGE 4 X 4" 12-PLY

## (undated) DEVICE — DRAPE IOBAN 33" X 23"

## (undated) DEVICE — DRAPE LARGE SHEET 72X85"

## (undated) DEVICE — SUT VICRYL 0 27" CT-1 UNDYED

## (undated) DEVICE — VALVE BIOPSY BRONCHOVIDEOSCOPE

## (undated) DEVICE — DRSG BENZOIN 0.6CC

## (undated) DEVICE — GOWN XL

## (undated) DEVICE — ELCTR GROUNDING PAD ADULT COVIDIEN

## (undated) DEVICE — POSITIONER STRAP ARMBOARD VELCRO TS-30

## (undated) DEVICE — SUT SILK 2-0 30" TIES

## (undated) DEVICE — SUT VICRYL 2-0 27" UR-6

## (undated) DEVICE — ELCTR EXTENSION STRAIGHT

## (undated) DEVICE — DRAPE 3/4 SHEET 52X76"

## (undated) DEVICE — STAPLER ECHELON FLEX POWERED ADV PLCMT TIP

## (undated) DEVICE — TAPE SILK 3"

## (undated) DEVICE — TUBING SUCTION NONCONDUCTIVE 6MM X 12FT

## (undated) DEVICE — DRSG DERMABOND PRINEO 42CM

## (undated) DEVICE — SYR SLIP 10CC

## (undated) DEVICE — CHEST DRAIN PLEUR-EVAC DRY/WET ADULT-PEDS SINGLE (QUICK)

## (undated) DEVICE — SYR LUER LOK 20CC

## (undated) DEVICE — POSITIONER FOAM EGG CRATE ULNAR 2PCS (PINK)

## (undated) DEVICE — SPONGE DISSECTOR PEANUT

## (undated) DEVICE — NDL HYPO REGULAR BEVEL 22G X 1.5" (TURQUOISE)

## (undated) DEVICE — MARKING PEN W RULER / LABELS

## (undated) DEVICE — STAPLER COVIDIEN ENDO GIA STANDARD HANDLE

## (undated) DEVICE — VENODYNE/SCD SLEEVE CALF MEDIUM

## (undated) DEVICE — SUT SILK 0 24" SH DA

## (undated) DEVICE — CHEST DRAIN OASIS DRY SUCTION WATER SEAL

## (undated) DEVICE — ELCTR BOVIE TIP BLADE INSULATED 6.5" EDGE

## (undated) DEVICE — DRSG STERISTRIPS 0.5 X 4"

## (undated) DEVICE — STAPLER SKIN VISI-STAT 35 WIDE

## (undated) DEVICE — CONNECTOR REDUCING STRAIGHT 3/8X0.25"

## (undated) DEVICE — DRSG TEGADERM 4X4.75"

## (undated) DEVICE — MEDICINE CUP WITH LID 60ML

## (undated) DEVICE — DRAPE MAGNETIC INSTRUMENT MEDIUM

## (undated) DEVICE — HAND-AID ARTERIAL WRIST SUPPORT

## (undated) DEVICE — DRAPE MAYO STAND 23"

## (undated) DEVICE — DRSG TELFA 3 X 8

## (undated) DEVICE — PACK MAJOR ABDOMINAL W ENDO DRAPE

## (undated) DEVICE — GLV 7.5 PROTEXIS (CREAM) MICRO

## (undated) DEVICE — WARMING BLANKET LOWER ADULT

## (undated) DEVICE — SUT SILK 0 18" TIES

## (undated) DEVICE — VALVE SUCTION EVIS 160/200/240

## (undated) DEVICE — FOLEY TRAY 16FR 5CC LF UMETER CLOSED